# Patient Record
Sex: MALE | Race: WHITE | Employment: OTHER | URBAN - METROPOLITAN AREA
[De-identification: names, ages, dates, MRNs, and addresses within clinical notes are randomized per-mention and may not be internally consistent; named-entity substitution may affect disease eponyms.]

---

## 2017-03-14 RX ORDER — HYDROXYUREA 500 MG/1
CAPSULE ORAL
Qty: 90 CAPSULE | Refills: 3 | Status: SHIPPED | COMMUNITY
Start: 2017-03-14 | End: 2018-02-23

## 2017-03-14 NOTE — MR AVS SNAPSHOT
After Visit Summary   4/11/2017    Brando Victoria    MRN: [id number]           Diagnoses this Visit     Essential thrombocytosis ([facility] [address]    -  Primary       Allergies     Bees       Your Vital Signs Were     BP Pulse Temp(Src) Resp    114/71 mmHg 52 97 December.       To Do List     Tuesday April 11, 2017     Ysitie 6 Internal:  CBC W/ DIFFERENTIAL        Tuesday April 11, 2017     LAB:  CBC WITH DIFFERENTIAL WITH PLATELET        Monday April 17, 2017 8:50 AM     Appointment with Vasu Mullen at [facility] .2 (H) 80.0-99.0  fL Final    MCH 34.6 (H) 27.0-33.2  pg Final    MCHC 33.2  31.0-37.0  g/dL Final    RDW 13.6  11.5-16.0  % Final    RDW-SD 52.0 (H) 35.1-46.3  fL Final    Neutrophil Absolute Prelim 5.00  1.30-6.70  x10 (3) uL Final    Neutrophil - - -

## 2017-04-11 ENCOUNTER — OFFICE VISIT (OUTPATIENT)
Dept: HEMATOLOGY/ONCOLOGY | Facility: HOSPITAL | Age: 72
End: 2017-04-11
Attending: INTERNAL MEDICINE
Payer: MEDICARE

## 2017-04-11 VITALS
HEART RATE: 52 BPM | BODY MASS INDEX: 27.46 KG/M2 | SYSTOLIC BLOOD PRESSURE: 114 MMHG | OXYGEN SATURATION: 98 % | WEIGHT: 185.38 LBS | HEIGHT: 69.02 IN | DIASTOLIC BLOOD PRESSURE: 71 MMHG | TEMPERATURE: 97 F | RESPIRATION RATE: 18 BRPM

## 2017-04-11 DIAGNOSIS — D47.3 ESSENTIAL THROMBOCYTOSIS (HCC): Primary | ICD-10-CM

## 2017-04-11 PROCEDURE — 99213 OFFICE O/P EST LOW 20 MIN: CPT | Performed by: INTERNAL MEDICINE

## 2017-04-11 RX ORDER — LORAZEPAM 0.5 MG/1
0.5 TABLET ORAL NIGHTLY
COMMUNITY
End: 2017-07-03

## 2017-04-11 NOTE — PROGRESS NOTES
Patient is here for MD f/u for thrombocytosis. On Hydrea 500 mg daily. Appetite and energy level is good. No complaints. Education Record    Learner:  Patient and Spouse    Disease / Diagnosis:   Thrombocytosis     Barriers / Limitations:  None   Comm

## 2017-04-13 NOTE — PROGRESS NOTES
[facility]    PATIENT'S NAME: Renukakhushi Ackermane   ATTENDING PHYSICIAN: Sonali Watson M.D.    PATIENT ACCOUNT #: [de-identified] LOCATION: [address] [place] [address] RECORD #: [id number] YOB: 1945   DATE OF SERVICE: 04/11/2017       CANCER CENTER percussion and clear to auscultation. No wheezing, rales, or rhonchi. HEART:  Normal.  ABDOMEN:  No hepatosplenomegaly or tenderness. EXTREMITIES:  No clubbing, cyanosis, or edema. NEUROLOGIC:  He is intact.     LABORATORY DATA:  White count 7.3, hemogl

## 2017-07-10 ENCOUNTER — PRIOR ORIGINAL RECORDS (OUTPATIENT)
Dept: OTHER | Age: 72
End: 2017-07-10

## 2017-07-10 ENCOUNTER — LAB ENCOUNTER (OUTPATIENT)
Dept: LAB | Facility: HOSPITAL | Age: 72
End: 2017-07-10
Attending: INTERNAL MEDICINE
Payer: MEDICARE

## 2017-07-10 DIAGNOSIS — E78.00 PURE HYPERCHOLESTEROLEMIA: Primary | ICD-10-CM

## 2017-07-10 DIAGNOSIS — D47.3 ESSENTIAL THROMBOCYTOSIS (HCC): ICD-10-CM

## 2017-07-10 LAB
ALT SERPL-CCNC: 26 U/L (ref 17–63)
AST SERPL-CCNC: 19 U/L (ref 15–41)
BASOPHILS # BLD AUTO: 0.06 X10(3) UL (ref 0–0.1)
BASOPHILS NFR BLD AUTO: 0.9 %
BUN BLD-MCNC: 12 MG/DL (ref 8–20)
CALCIUM BLD-MCNC: 9.3 MG/DL (ref 8.3–10.3)
CHLORIDE: 106 MMOL/L (ref 101–111)
CHOLEST SMN-MCNC: 140 MG/DL (ref ?–200)
CO2: 25 MMOL/L (ref 22–32)
CREAT BLD-MCNC: 0.89 MG/DL (ref 0.7–1.3)
EOSINOPHIL # BLD AUTO: 0.12 X10(3) UL (ref 0–0.3)
EOSINOPHIL NFR BLD AUTO: 1.8 %
ERYTHROCYTE [DISTWIDTH] IN BLOOD BY AUTOMATED COUNT: 13.7 % (ref 11.5–16)
GLUCOSE BLD-MCNC: 85 MG/DL (ref 70–99)
HCT VFR BLD AUTO: 41.7 % (ref 37–53)
HDLC SERPL-MCNC: 60 MG/DL (ref 45–?)
HDLC SERPL: 2.33 {RATIO} (ref ?–4.97)
HGB BLD-MCNC: 13.8 G/DL (ref 13–17)
IMMATURE GRANULOCYTE COUNT: 0.04 X10(3) UL (ref 0–1)
IMMATURE GRANULOCYTE RATIO %: 0.6 %
LDLC SERPL CALC-MCNC: 60 MG/DL (ref ?–130)
LYMPHOCYTES # BLD AUTO: 1.29 X10(3) UL (ref 0.9–4)
LYMPHOCYTES NFR BLD AUTO: 19 %
MCH RBC QN AUTO: 33.5 PG (ref 27–33.2)
MCHC RBC AUTO-ENTMCNC: 33.1 G/DL (ref 31–37)
MCV RBC AUTO: 101.2 FL (ref 80–99)
MONOCYTES # BLD AUTO: 0.63 X10(3) UL (ref 0.1–0.6)
MONOCYTES NFR BLD AUTO: 9.3 %
NEUTROPHIL ABS PRELIM: 4.64 X10 (3) UL (ref 1.3–6.7)
NEUTROPHILS # BLD AUTO: 4.64 X10(3) UL (ref 1.3–6.7)
NEUTROPHILS NFR BLD AUTO: 68.4 %
NONHDLC SERPL-MCNC: 80 MG/DL (ref ?–130)
PLATELET # BLD AUTO: 410 10(3)UL (ref 150–450)
POTASSIUM SERPL-SCNC: 4.2 MMOL/L (ref 3.6–5.1)
RBC # BLD AUTO: 4.12 X10(6)UL (ref 3.8–5.8)
RED CELL DISTRIBUTION WIDTH-SD: 51.3 FL (ref 35.1–46.3)
SODIUM SERPL-SCNC: 137 MMOL/L (ref 136–144)
TRIGLYCERIDES: 100 MG/DL (ref ?–150)
VLDL: 20 MG/DL (ref 5–40)
WBC # BLD AUTO: 6.8 X10(3) UL (ref 4–13)

## 2017-07-10 PROCEDURE — 85025 COMPLETE CBC W/AUTO DIFF WBC: CPT

## 2017-07-10 PROCEDURE — 80061 LIPID PANEL: CPT

## 2017-07-10 PROCEDURE — 36415 COLL VENOUS BLD VENIPUNCTURE: CPT

## 2017-07-10 PROCEDURE — 80048 BASIC METABOLIC PNL TOTAL CA: CPT

## 2017-07-10 PROCEDURE — 84460 ALANINE AMINO (ALT) (SGPT): CPT

## 2017-07-10 PROCEDURE — 84450 TRANSFERASE (AST) (SGOT): CPT

## 2017-07-12 LAB
BUN: 12 MG/DL
CALCIUM: 9.3 MG/DL
CHOLESTEROL, TOTAL: 140 MG/DL
CREATININE, SERUM: 0.89 MG/DL
GLUCOSE: 85 MG/DL
HDL CHOLESTEROL: 60 MG/DL
LDL CHOLESTEROL: 60 MG/DL
POTASSIUM, SERUM: 4.2 MEQ/L
SGOT (AST): 19 IU/L
SGPT (ALT): 26 IU/L
SODIUM: 137 MEQ/L
TRIGLYCERIDES: 100 MG/DL

## 2017-07-13 ENCOUNTER — PRIOR ORIGINAL RECORDS (OUTPATIENT)
Dept: OTHER | Age: 72
End: 2017-07-13

## 2017-07-19 LAB
HEMATOCRIT: 41.7 %
HEMOGLOBIN: 13.8 G/DL
PLATELETS: 410 K/UL
RED BLOOD COUNT: 4.12 X 10-6/U
WHITE BLOOD COUNT: 6.8 X 10-3/U

## 2017-08-09 ENCOUNTER — APPOINTMENT (OUTPATIENT)
Dept: HEMATOLOGY/ONCOLOGY | Facility: HOSPITAL | Age: 72
End: 2017-08-09
Attending: INTERNAL MEDICINE
Payer: MEDICARE

## 2017-12-01 ENCOUNTER — OFFICE VISIT (OUTPATIENT)
Dept: HEMATOLOGY/ONCOLOGY | Facility: HOSPITAL | Age: 72
End: 2017-12-01
Attending: INTERNAL MEDICINE
Payer: MEDICARE

## 2017-12-01 VITALS
HEIGHT: 69.02 IN | HEART RATE: 53 BPM | RESPIRATION RATE: 18 BRPM | WEIGHT: 182.38 LBS | TEMPERATURE: 96 F | BODY MASS INDEX: 27.01 KG/M2 | OXYGEN SATURATION: 97 % | SYSTOLIC BLOOD PRESSURE: 110 MMHG | DIASTOLIC BLOOD PRESSURE: 74 MMHG

## 2017-12-01 DIAGNOSIS — D47.3 ESSENTIAL THROMBOCYTOSIS (HCC): ICD-10-CM

## 2017-12-01 PROCEDURE — 99213 OFFICE O/P EST LOW 20 MIN: CPT | Performed by: INTERNAL MEDICINE

## 2017-12-02 NOTE — PROGRESS NOTES
[facility]    PATIENT'S NAME: Nessa Felipe   ATTENDING PHYSICIAN: Mariaa Morris M.D.    PATIENT ACCOUNT #: [de-identified] LOCATION: [address] [place] [address] RECORD #: [id number] YOB: 1945   DATE OF SERVICE: 12/01/2017       CANCER CENTER intact. LABORATORY DATA:  His white count is 5.6, hemoglobin 13.8, platelets are 413. His MCV is 105 as expected from the hydroxyurea. His differential is normal.      IMPRESSION:  Essentially thrombocytosis.   While his platelet count is slightly over

## 2017-12-07 ENCOUNTER — PRIOR ORIGINAL RECORDS (OUTPATIENT)
Dept: OTHER | Age: 72
End: 2017-12-07

## 2017-12-18 ENCOUNTER — HOSPITAL ENCOUNTER (OUTPATIENT)
Dept: CV DIAGNOSTICS | Facility: HOSPITAL | Age: 72
Discharge: HOME | End: 2017-12-18
Attending: INTERNAL MEDICINE
Payer: MEDICARE

## 2017-12-18 DIAGNOSIS — I25.119 CORONARY ARTERY DISEASE INVOLVING NATIVE HEART WITH ANGINA PECTORIS, UNSPECIFIED VESSEL OR LESION TYPE (HCC): ICD-10-CM

## 2017-12-18 PROCEDURE — 93017 CV STRESS TEST TRACING ONLY: CPT | Performed by: INTERNAL MEDICINE

## 2017-12-18 PROCEDURE — 93350 STRESS TTE ONLY: CPT | Performed by: INTERNAL MEDICINE

## 2017-12-18 PROCEDURE — 93018 CV STRESS TEST I&R ONLY: CPT | Performed by: INTERNAL MEDICINE

## 2018-02-23 ENCOUNTER — TELEPHONE (OUTPATIENT)
Dept: HEMATOLOGY/ONCOLOGY | Facility: HOSPITAL | Age: 73
End: 2018-02-23

## 2018-02-23 RX ORDER — HYDROXYUREA 500 MG/1
500 CAPSULE ORAL DAILY
Qty: 90 CAPSULE | Refills: 3 | Status: SHIPPED | OUTPATIENT
Start: 2018-02-23 | End: 2018-06-01

## 2018-04-11 ENCOUNTER — LAB ENCOUNTER (OUTPATIENT)
Dept: LAB | Age: 73
End: 2018-04-11
Attending: OTOLARYNGOLOGY
Payer: MEDICARE

## 2018-04-11 DIAGNOSIS — E07.9 THYROID DYSFUNCTION: ICD-10-CM

## 2018-04-11 PROCEDURE — 84443 ASSAY THYROID STIM HORMONE: CPT

## 2018-04-11 PROCEDURE — 36415 COLL VENOUS BLD VENIPUNCTURE: CPT

## 2018-04-11 PROCEDURE — 84439 ASSAY OF FREE THYROXINE: CPT

## 2018-05-03 PROBLEM — N13.8 BPH WITH OBSTRUCTION/LOWER URINARY TRACT SYMPTOMS: Status: ACTIVE | Noted: 2018-05-03

## 2018-05-03 PROBLEM — I25.119 CORONARY ARTERY DISEASE INVOLVING NATIVE HEART WITH ANGINA PECTORIS, UNSPECIFIED VESSEL OR LESION TYPE: Status: ACTIVE | Noted: 2018-05-03

## 2018-05-03 PROBLEM — I25.119 CORONARY ARTERY DISEASE INVOLVING NATIVE HEART WITH ANGINA PECTORIS, UNSPECIFIED VESSEL OR LESION TYPE (HCC): Status: ACTIVE | Noted: 2018-05-03

## 2018-05-03 PROBLEM — N40.1 BPH WITH OBSTRUCTION/LOWER URINARY TRACT SYMPTOMS: Status: ACTIVE | Noted: 2018-05-03

## 2018-05-11 ENCOUNTER — LAB ENCOUNTER (OUTPATIENT)
Dept: LAB | Facility: HOSPITAL | Age: 73
End: 2018-05-11
Attending: INTERNAL MEDICINE
Payer: MEDICARE

## 2018-05-11 DIAGNOSIS — D47.3 ESSENTIAL THROMBOCYTOSIS (HCC): ICD-10-CM

## 2018-05-11 DIAGNOSIS — Z12.5 SCREENING PSA (PROSTATE SPECIFIC ANTIGEN): ICD-10-CM

## 2018-05-11 PROCEDURE — 84153 ASSAY OF PSA TOTAL: CPT

## 2018-05-11 PROCEDURE — 85027 COMPLETE CBC AUTOMATED: CPT

## 2018-05-11 PROCEDURE — 36415 COLL VENOUS BLD VENIPUNCTURE: CPT

## 2018-05-11 NOTE — PROGRESS NOTES
Your lab results are within acceptable limits. The MCV is slightly higher than in the past. This is likely due to hydrea. Please discuss this lab with Dr. Laura Begum.

## 2018-06-01 ENCOUNTER — OFFICE VISIT (OUTPATIENT)
Dept: HEMATOLOGY/ONCOLOGY | Facility: HOSPITAL | Age: 73
End: 2018-06-01
Attending: INTERNAL MEDICINE
Payer: MEDICARE

## 2018-06-01 VITALS
SYSTOLIC BLOOD PRESSURE: 111 MMHG | TEMPERATURE: 97 F | HEART RATE: 57 BPM | RESPIRATION RATE: 18 BRPM | HEIGHT: 69.02 IN | BODY MASS INDEX: 26.46 KG/M2 | DIASTOLIC BLOOD PRESSURE: 68 MMHG | OXYGEN SATURATION: 97 % | WEIGHT: 178.63 LBS

## 2018-06-01 DIAGNOSIS — D47.3 ESSENTIAL THROMBOCYTOSIS (HCC): ICD-10-CM

## 2018-06-01 PROCEDURE — 99213 OFFICE O/P EST LOW 20 MIN: CPT | Performed by: INTERNAL MEDICINE

## 2018-06-01 RX ORDER — HYDROXYUREA 500 MG/1
CAPSULE ORAL
Qty: 90 CAPSULE | Refills: 1 | Status: SHIPPED | OUTPATIENT
Start: 2018-06-01 | End: 2018-12-03

## 2018-06-01 NOTE — PROGRESS NOTES
.Outpatient Oncology Care Plan  Problem list:  knowledge deficit    Problems related to:    disease/disease progression    Interventions:  provided general teaching    Expected outcomes:  understands plan of care    Progress towards outcome:  making progre

## 2018-06-01 NOTE — PROGRESS NOTES
[facility]    PATIENT'S NAME: Jonathan Cook   ATTENDING PHYSICIAN: Jose Maya M.D.    PATIENT ACCOUNT #: [de-identified] LOCATION: [address] [place] [address] RECORD #: [id number] YOB: 1945   DATE OF SERVICE: 06/01/2018       CANCER CENTER rales, or rhonchi. HEART:  Normal.   ABDOMEN:  No hepatosplenomegaly or tenderness. EXTREMITIES:  He has no clubbing, cyanosis, or edema. NEUROLOGIC:  He is intact. LABORATORY DATA:  His labs were done 3 weeks ago.   White count was 6.5, hemoglobi

## 2018-06-04 ENCOUNTER — HOSPITAL ENCOUNTER (OUTPATIENT)
Dept: LAB | Facility: HOSPITAL | Age: 73
Discharge: HOME | End: 2018-06-04
Attending: INTERNAL MEDICINE
Payer: MEDICARE

## 2018-06-04 ENCOUNTER — PRIOR ORIGINAL RECORDS (OUTPATIENT)
Dept: OTHER | Age: 73
End: 2018-06-04

## 2018-06-04 PROCEDURE — 84460 ALANINE AMINO (ALT) (SGPT): CPT | Performed by: INTERNAL MEDICINE

## 2018-06-04 PROCEDURE — 36415 COLL VENOUS BLD VENIPUNCTURE: CPT | Performed by: INTERNAL MEDICINE

## 2018-06-04 PROCEDURE — 84450 TRANSFERASE (AST) (SGOT): CPT | Performed by: INTERNAL MEDICINE

## 2018-06-04 PROCEDURE — 80061 LIPID PANEL: CPT | Performed by: INTERNAL MEDICINE

## 2018-06-04 PROCEDURE — 80048 BASIC METABOLIC PNL TOTAL CA: CPT | Performed by: INTERNAL MEDICINE

## 2018-06-07 ENCOUNTER — PRIOR ORIGINAL RECORDS (OUTPATIENT)
Dept: OTHER | Age: 73
End: 2018-06-07

## 2018-06-22 LAB
BUN: 14 MG/DL
CALCIUM: 9.7 MG/DL
CHLORIDE: 106 MEQ/L
CHOLESTEROL, TOTAL: 146 MG/DL
CREATININE, SERUM: 0.92 MG/DL
GLUCOSE: 95 MG/DL
HDL CHOLESTEROL: 63 MG/DL
LDL CHOLESTEROL: 66 MG/DL
POTASSIUM, SERUM: 4.3 MEQ/L
SODIUM: 140 MEQ/L
TRIGLYCERIDES: 87 MG/DL

## 2018-09-10 PROCEDURE — 88305 TISSUE EXAM BY PATHOLOGIST: CPT | Performed by: INTERNAL MEDICINE

## 2018-09-14 ENCOUNTER — PRIOR ORIGINAL RECORDS (OUTPATIENT)
Dept: OTHER | Age: 73
End: 2018-09-14

## 2018-12-03 RX ORDER — HYDROXYUREA 500 MG/1
500 CAPSULE ORAL
Qty: 90 CAPSULE | Refills: 1 | Status: SHIPPED | OUTPATIENT
Start: 2018-12-03 | End: 2019-06-06

## 2018-12-06 ENCOUNTER — MYAURORA ACCOUNT LINK (OUTPATIENT)
Dept: OTHER | Age: 73
End: 2018-12-06

## 2018-12-06 ENCOUNTER — PRIOR ORIGINAL RECORDS (OUTPATIENT)
Dept: OTHER | Age: 73
End: 2018-12-06

## 2018-12-18 ENCOUNTER — OFFICE VISIT (OUTPATIENT)
Dept: HEMATOLOGY/ONCOLOGY | Facility: HOSPITAL | Age: 73
End: 2018-12-18
Attending: INTERNAL MEDICINE
Payer: MEDICARE

## 2018-12-18 VITALS
TEMPERATURE: 97 F | SYSTOLIC BLOOD PRESSURE: 110 MMHG | RESPIRATION RATE: 18 BRPM | DIASTOLIC BLOOD PRESSURE: 72 MMHG | BODY MASS INDEX: 26.93 KG/M2 | WEIGHT: 181.81 LBS | HEART RATE: 61 BPM | OXYGEN SATURATION: 98 % | HEIGHT: 69.02 IN

## 2018-12-18 DIAGNOSIS — D47.3 ESSENTIAL THROMBOCYTOSIS (HCC): Primary | ICD-10-CM

## 2018-12-18 PROCEDURE — 99213 OFFICE O/P EST LOW 20 MIN: CPT | Performed by: INTERNAL MEDICINE

## 2018-12-18 NOTE — PROGRESS NOTES
Patient is here for MD f/u for essential thrombocytosis. Patient had left shoulder rotator cuff on 9/28. Patient c/o pain in right elbow. Seeing ortho for this. Continues on Hydrea 500 mg daily. No complaints at present time.       Education Record    [facility]

## 2019-01-01 NOTE — PROGRESS NOTES
[facility]    PATIENT'S NAME: Marco Staton   ATTENDING PHYSICIAN: Flaquita Betts M.D.    PATIENT ACCOUNT #: [de-identified] LOCATION: [address] [place] [address] RECORD #: [id number] YOB: 1945   DATE OF SERVICE: 12/18/2018       CANCER CENTER is 20, temperature is 97.1. HEENT:  Unremarkable. He has pink conjunctivae, anicteric sclerae. Pharynx is without lesions. LYMPHATICS:  He has no cervical, supraclavicular, or axillary adenopathy.   LUNGS:  Resonant to percussion and clear to auscult

## 2019-02-28 VITALS
WEIGHT: 182 LBS | DIASTOLIC BLOOD PRESSURE: 58 MMHG | HEART RATE: 55 BPM | HEIGHT: 69 IN | SYSTOLIC BLOOD PRESSURE: 110 MMHG | BODY MASS INDEX: 26.96 KG/M2

## 2019-02-28 VITALS — HEART RATE: 68 BPM | WEIGHT: 181 LBS | SYSTOLIC BLOOD PRESSURE: 90 MMHG | DIASTOLIC BLOOD PRESSURE: 50 MMHG

## 2019-02-28 VITALS — SYSTOLIC BLOOD PRESSURE: 110 MMHG | DIASTOLIC BLOOD PRESSURE: 66 MMHG | HEART RATE: 60 BPM | WEIGHT: 183 LBS

## 2019-02-28 VITALS
DIASTOLIC BLOOD PRESSURE: 62 MMHG | HEIGHT: 70 IN | BODY MASS INDEX: 25.77 KG/M2 | WEIGHT: 180 LBS | SYSTOLIC BLOOD PRESSURE: 110 MMHG | HEART RATE: 72 BPM

## 2019-04-04 RX ORDER — FLECAINIDE ACETATE 50 MG/1
TABLET ORAL
COMMUNITY
Start: 2015-09-17

## 2019-04-04 RX ORDER — LEVOTHYROXINE SODIUM 88 UG/1
TABLET ORAL
COMMUNITY

## 2019-04-04 RX ORDER — SILICONE ADHESIVE 1.5" X 3"
SHEET (EA) TOPICAL
COMMUNITY

## 2019-04-04 RX ORDER — HYDROXYUREA 500 MG/1
CAPSULE ORAL
COMMUNITY

## 2019-04-04 RX ORDER — VARDENAFIL HYDROCHLORIDE 20 MG/1
TABLET ORAL
COMMUNITY

## 2019-04-04 RX ORDER — FAMOTIDINE 20 MG/1
TABLET, FILM COATED ORAL
COMMUNITY
End: 2019-07-18 | Stop reason: ALTCHOICE

## 2019-04-04 RX ORDER — GLUCOSAMINE/CHONDR SU A SOD 750-600 MG
TABLET ORAL
COMMUNITY
End: 2020-07-01 | Stop reason: SDUPTHER

## 2019-04-04 RX ORDER — METOPROLOL SUCCINATE 25 MG/1
TABLET, EXTENDED RELEASE ORAL
COMMUNITY
Start: 2014-06-12

## 2019-04-04 RX ORDER — UBIDECARENONE 50 MG
CAPSULE ORAL
COMMUNITY
Start: 2015-08-06

## 2019-04-04 RX ORDER — ATORVASTATIN CALCIUM 40 MG/1
TABLET, FILM COATED ORAL
COMMUNITY
Start: 2017-12-07

## 2019-04-10 ENCOUNTER — LAB ENCOUNTER (OUTPATIENT)
Dept: LAB | Facility: HOSPITAL | Age: 74
End: 2019-04-10
Attending: OTOLARYNGOLOGY
Payer: MEDICARE

## 2019-04-10 DIAGNOSIS — E03.9 HYPOTHYROIDISM, UNSPECIFIED TYPE: ICD-10-CM

## 2019-04-10 DIAGNOSIS — E07.9 DISORDER OF THYROID GLAND: ICD-10-CM

## 2019-04-10 DIAGNOSIS — E07.9 THYROID DYSFUNCTION: ICD-10-CM

## 2019-04-10 PROCEDURE — 84439 ASSAY OF FREE THYROXINE: CPT

## 2019-04-10 PROCEDURE — 84443 ASSAY THYROID STIM HORMONE: CPT

## 2019-04-10 PROCEDURE — 36415 COLL VENOUS BLD VENIPUNCTURE: CPT

## 2019-04-16 ENCOUNTER — APPOINTMENT (OUTPATIENT)
Dept: HEMATOLOGY/ONCOLOGY | Facility: HOSPITAL | Age: 74
End: 2019-04-16
Attending: INTERNAL MEDICINE
Payer: MEDICARE

## 2019-04-16 DIAGNOSIS — D47.3 ESSENTIAL THROMBOCYTOSIS (HCC): ICD-10-CM

## 2019-04-16 DIAGNOSIS — D47.3 ESSENTIAL THROMBOCYTOSIS (HCC): Primary | ICD-10-CM

## 2019-04-16 PROCEDURE — 36415 COLL VENOUS BLD VENIPUNCTURE: CPT

## 2019-04-16 PROCEDURE — 85025 COMPLETE CBC W/AUTO DIFF WBC: CPT

## 2019-04-16 NOTE — PROGRESS NOTES
Spoke with patient. Plts were higher. He was ill with diarrheal illness last 24 hours. Told him to stay on current dose and recheck in a month.   MOISES Coleman

## 2019-05-06 ENCOUNTER — TELEPHONE (OUTPATIENT)
Dept: CARDIOLOGY | Age: 74
End: 2019-05-06

## 2019-05-06 PROBLEM — D75.89 MACROCYTOSIS WITHOUT ANEMIA: Status: ACTIVE | Noted: 2019-05-06

## 2019-05-06 PROBLEM — I10 ESSENTIAL HYPERTENSION: Status: ACTIVE | Noted: 2019-05-06

## 2019-05-15 ENCOUNTER — HOSPITAL ENCOUNTER (OUTPATIENT)
Dept: CARDIOLOGY CLINIC | Facility: HOSPITAL | Age: 74
Discharge: HOME | End: 2019-05-15
Attending: INTERNAL MEDICINE

## 2019-05-15 DIAGNOSIS — Z13.6 SCREENING FOR CARDIOVASCULAR CONDITION: ICD-10-CM

## 2019-05-23 ENCOUNTER — NURSE ONLY (OUTPATIENT)
Dept: HEMATOLOGY/ONCOLOGY | Facility: HOSPITAL | Age: 74
End: 2019-05-23
Attending: INTERNAL MEDICINE
Payer: MEDICARE

## 2019-05-23 DIAGNOSIS — D47.3 ESSENTIAL THROMBOCYTOSIS (HCC): ICD-10-CM

## 2019-05-23 PROCEDURE — 85025 COMPLETE CBC W/AUTO DIFF WBC: CPT

## 2019-05-23 PROCEDURE — 36415 COLL VENOUS BLD VENIPUNCTURE: CPT

## 2019-06-06 ENCOUNTER — TELEPHONE (OUTPATIENT)
Dept: HEMATOLOGY/ONCOLOGY | Facility: HOSPITAL | Age: 74
End: 2019-06-06

## 2019-06-06 RX ORDER — HYDROXYUREA 500 MG/1
500 CAPSULE ORAL
Qty: 90 CAPSULE | Refills: 2 | Status: SHIPPED | OUTPATIENT
Start: 2019-06-06 | End: 2019-12-17

## 2019-06-20 ENCOUNTER — APPOINTMENT (OUTPATIENT)
Dept: CARDIOLOGY | Age: 74
End: 2019-06-20

## 2019-07-16 ENCOUNTER — LAB ENCOUNTER (OUTPATIENT)
Dept: LAB | Facility: HOSPITAL | Age: 74
End: 2019-07-16
Attending: INTERNAL MEDICINE
Payer: MEDICARE

## 2019-07-16 DIAGNOSIS — I10 ESSENTIAL HYPERTENSION, MALIGNANT: ICD-10-CM

## 2019-07-16 DIAGNOSIS — E78.00 PURE HYPERCHOLESTEROLEMIA: Primary | ICD-10-CM

## 2019-07-16 LAB
ALT SERPL-CCNC: 22 U/L (ref 16–61)
ANION GAP SERPL CALC-SCNC: 2 MMOL/L (ref 0–18)
AST SERPL-CCNC: 19 U/L (ref 15–37)
BUN BLD-MCNC: 13 MG/DL (ref 7–18)
BUN/CREAT SERPL: 14.3 (ref 10–20)
CALCIUM BLD-MCNC: 9.1 MG/DL (ref 8.5–10.1)
CHLORIDE SERPL-SCNC: 106 MMOL/L (ref 98–112)
CHOLEST SMN-MCNC: 128 MG/DL (ref ?–200)
CO2 SERPL-SCNC: 29 MMOL/L (ref 21–32)
CREAT BLD-MCNC: 0.91 MG/DL (ref 0.7–1.3)
GLUCOSE BLD-MCNC: 79 MG/DL (ref 70–99)
HDLC SERPL-MCNC: 61 MG/DL (ref 40–59)
LDLC SERPL CALC-MCNC: 55 MG/DL (ref ?–100)
NONHDLC SERPL-MCNC: 67 MG/DL (ref ?–130)
OSMOLALITY SERPL CALC.SUM OF ELEC: 283 MOSM/KG (ref 275–295)
POTASSIUM SERPL-SCNC: 4.3 MMOL/L (ref 3.5–5.1)
SODIUM SERPL-SCNC: 137 MMOL/L (ref 136–145)
TRIGL SERPL-MCNC: 60 MG/DL (ref 30–149)
VLDLC SERPL CALC-MCNC: 12 MG/DL (ref 0–30)

## 2019-07-16 PROCEDURE — 84450 TRANSFERASE (AST) (SGOT): CPT

## 2019-07-16 PROCEDURE — 80048 BASIC METABOLIC PNL TOTAL CA: CPT

## 2019-07-16 PROCEDURE — 84460 ALANINE AMINO (ALT) (SGPT): CPT

## 2019-07-16 PROCEDURE — 80061 LIPID PANEL: CPT

## 2019-07-16 PROCEDURE — 36415 COLL VENOUS BLD VENIPUNCTURE: CPT

## 2019-07-18 ENCOUNTER — OFFICE VISIT (OUTPATIENT)
Dept: CARDIOLOGY | Age: 74
End: 2019-07-18

## 2019-07-18 VITALS
WEIGHT: 180 LBS | SYSTOLIC BLOOD PRESSURE: 104 MMHG | DIASTOLIC BLOOD PRESSURE: 60 MMHG | HEIGHT: 70 IN | HEART RATE: 60 BPM | BODY MASS INDEX: 25.77 KG/M2

## 2019-07-18 DIAGNOSIS — I65.23 ASYMPTOMATIC BILATERAL CAROTID ARTERY STENOSIS: ICD-10-CM

## 2019-07-18 DIAGNOSIS — I25.10 CAD IN NATIVE ARTERY: Primary | ICD-10-CM

## 2019-07-18 DIAGNOSIS — E78.00 PURE HYPERCHOLESTEROLEMIA: ICD-10-CM

## 2019-07-18 PROBLEM — I48.0 PAROXYSMAL ATRIAL FIBRILLATION (CMD): Status: ACTIVE | Noted: 2018-12-06

## 2019-07-18 PROBLEM — Z82.49 FAMILY HISTORY OF CORONARY ARTERIOSCLEROSIS: Status: ACTIVE | Noted: 2017-07-13

## 2019-07-18 PROCEDURE — 99214 OFFICE O/P EST MOD 30 MIN: CPT | Performed by: INTERNAL MEDICINE

## 2019-07-18 RX ORDER — OMEPRAZOLE 40 MG/1
40 CAPSULE, DELAYED RELEASE ORAL DAILY
COMMUNITY
End: 2020-07-01

## 2019-12-03 ENCOUNTER — DOCUMENTATION (OUTPATIENT)
Dept: CARDIOLOGY | Age: 74
End: 2019-12-03

## 2019-12-03 ENCOUNTER — HOSPITAL ENCOUNTER (OUTPATIENT)
Dept: CV DIAGNOSTICS | Facility: HOSPITAL | Age: 74
Discharge: HOME | End: 2019-12-03
Attending: INTERNAL MEDICINE
Payer: MEDICARE

## 2019-12-03 DIAGNOSIS — I25.10 CAD IN NATIVE ARTERY: ICD-10-CM

## 2019-12-03 PROCEDURE — 93350 STRESS TTE ONLY: CPT | Performed by: INTERNAL MEDICINE

## 2019-12-03 PROCEDURE — 93017 CV STRESS TEST TRACING ONLY: CPT | Performed by: INTERNAL MEDICINE

## 2019-12-03 PROCEDURE — 93018 CV STRESS TEST I&R ONLY: CPT | Performed by: INTERNAL MEDICINE

## 2019-12-05 ENCOUNTER — TELEPHONE (OUTPATIENT)
Dept: CARDIOLOGY | Age: 74
End: 2019-12-05

## 2019-12-10 DIAGNOSIS — I25.10 CAD IN NATIVE ARTERY: ICD-10-CM

## 2019-12-17 ENCOUNTER — OFFICE VISIT (OUTPATIENT)
Dept: HEMATOLOGY/ONCOLOGY | Facility: HOSPITAL | Age: 74
End: 2019-12-17
Attending: INTERNAL MEDICINE
Payer: MEDICARE

## 2019-12-17 VITALS
DIASTOLIC BLOOD PRESSURE: 74 MMHG | SYSTOLIC BLOOD PRESSURE: 121 MMHG | WEIGHT: 182 LBS | HEIGHT: 69.02 IN | BODY MASS INDEX: 26.96 KG/M2 | TEMPERATURE: 97 F | HEART RATE: 56 BPM | OXYGEN SATURATION: 98 % | RESPIRATION RATE: 16 BRPM

## 2019-12-17 DIAGNOSIS — D47.3 ESSENTIAL THROMBOCYTOSIS (HCC): ICD-10-CM

## 2019-12-17 PROCEDURE — 99213 OFFICE O/P EST LOW 20 MIN: CPT | Performed by: INTERNAL MEDICINE

## 2019-12-17 RX ORDER — HYDROXYUREA 500 MG/1
500 CAPSULE ORAL
Qty: 90 CAPSULE | Refills: 3 | Status: SHIPPED | OUTPATIENT
Start: 2019-12-17 | End: 2020-12-08

## 2019-12-17 NOTE — PROGRESS NOTES
Patient is here for MD f/u for essential thrombocytosis. Continues on Hydrea 500 mg daily. He is tolerating this well. Eating well. Denies pain. No complaints.       Education Record    Learner:  Patient    Disease / Diagnosis:  Essential Thrombocytosis

## 2019-12-19 RX ORDER — OMEPRAZOLE 20 MG/1
CAPSULE, DELAYED RELEASE ORAL
COMMUNITY
Start: 2019-11-23 | End: 2020-07-01

## 2019-12-19 RX ORDER — ACETAMINOPHEN 650 MG/1
650 SUPPOSITORY RECTAL
COMMUNITY
Start: 2015-05-21 | End: 2019-12-23 | Stop reason: ALTCHOICE

## 2019-12-19 RX ORDER — TRAMADOL HYDROCHLORIDE 50 MG/1
50-100 TABLET ORAL
COMMUNITY
Start: 2019-07-05

## 2019-12-19 RX ORDER — KETOCONAZOLE 20 MG/ML
SHAMPOO TOPICAL
COMMUNITY
Start: 2019-11-07

## 2019-12-19 RX ORDER — LIDOCAINE 50 MG/G
OINTMENT TOPICAL
COMMUNITY
Start: 2017-12-26

## 2019-12-19 RX ORDER — LORAZEPAM 0.5 MG/1
TABLET ORAL
COMMUNITY
Start: 2019-12-03

## 2019-12-19 RX ORDER — CLINDAMYCIN PHOSPHATE 10 MG/G
GEL TOPICAL
COMMUNITY
Start: 2018-10-09

## 2019-12-23 ENCOUNTER — OFFICE VISIT (OUTPATIENT)
Dept: CARDIOLOGY | Age: 74
End: 2019-12-23

## 2019-12-23 VITALS
HEART RATE: 59 BPM | WEIGHT: 180 LBS | HEIGHT: 69 IN | BODY MASS INDEX: 26.66 KG/M2 | DIASTOLIC BLOOD PRESSURE: 56 MMHG | SYSTOLIC BLOOD PRESSURE: 120 MMHG

## 2019-12-23 DIAGNOSIS — I65.23 ASYMPTOMATIC BILATERAL CAROTID ARTERY STENOSIS: ICD-10-CM

## 2019-12-23 DIAGNOSIS — E78.00 PURE HYPERCHOLESTEROLEMIA: ICD-10-CM

## 2019-12-23 DIAGNOSIS — I25.10 CAD IN NATIVE ARTERY: Primary | ICD-10-CM

## 2019-12-23 DIAGNOSIS — Z82.49 FAMILY HISTORY OF CORONARY ARTERIOSCLEROSIS: ICD-10-CM

## 2019-12-23 PROCEDURE — 99214 OFFICE O/P EST MOD 30 MIN: CPT | Performed by: INTERNAL MEDICINE

## 2019-12-23 ASSESSMENT — PATIENT HEALTH QUESTIONNAIRE - PHQ9
SUM OF ALL RESPONSES TO PHQ9 QUESTIONS 1 AND 2: 0
2. FEELING DOWN, DEPRESSED OR HOPELESS: NOT AT ALL
SUM OF ALL RESPONSES TO PHQ9 QUESTIONS 1 AND 2: 0
1. LITTLE INTEREST OR PLEASURE IN DOING THINGS: NOT AT ALL

## 2019-12-31 NOTE — PROGRESS NOTES
[facility]    PATIENT'S NAME: Jasper Samirjason   ATTENDING PHYSICIAN: Joselyn Holm M.D.    PATIENT ACCOUNT #: [de-identified] LOCATION: [address] [place] [address] RECORD #: [id number] YOB: 1945   DATE OF SERVICE: 12/17/2019       CANCER CENTER supraclavicular, or axillary adenopathy. LUNGS:  Resonant to percussion and clear to auscultation, with no wheezing, rales, or rhonchi. HEART:  Regular S1 and S2, with no murmur or gallop. ABDOMEN:  No hepatosplenomegaly or tenderness.    EXTREMITIES:

## 2020-01-09 ENCOUNTER — TELEPHONE (OUTPATIENT)
Dept: CARDIOLOGY | Age: 75
End: 2020-01-09

## 2020-04-01 ENCOUNTER — LAB ENCOUNTER (OUTPATIENT)
Dept: LAB | Age: 75
End: 2020-04-01
Attending: OTOLARYNGOLOGY
Payer: MEDICARE

## 2020-04-01 DIAGNOSIS — E04.1 THYROID NODULE: ICD-10-CM

## 2020-04-01 DIAGNOSIS — E07.9 THYROID DYSFUNCTION: ICD-10-CM

## 2020-04-01 PROCEDURE — 84439 ASSAY OF FREE THYROXINE: CPT

## 2020-04-01 PROCEDURE — 36415 COLL VENOUS BLD VENIPUNCTURE: CPT

## 2020-04-01 PROCEDURE — 84443 ASSAY THYROID STIM HORMONE: CPT

## 2020-05-19 PROBLEM — M16.12 OSTEOARTHRITIS OF LEFT HIP, UNSPECIFIED OSTEOARTHRITIS TYPE: Status: ACTIVE | Noted: 2020-05-19

## 2020-06-04 ENCOUNTER — LAB ENCOUNTER (OUTPATIENT)
Dept: LAB | Facility: HOSPITAL | Age: 75
End: 2020-06-04
Attending: INTERNAL MEDICINE
Payer: MEDICARE

## 2020-06-04 DIAGNOSIS — Z00.00 ROUTINE PHYSICAL EXAMINATION: ICD-10-CM

## 2020-06-04 DIAGNOSIS — Z12.5 SCREENING PSA (PROSTATE SPECIFIC ANTIGEN): ICD-10-CM

## 2020-06-04 DIAGNOSIS — I10 ESSENTIAL HYPERTENSION: ICD-10-CM

## 2020-06-04 DIAGNOSIS — D47.3 ESSENTIAL THROMBOCYTOSIS (HCC): ICD-10-CM

## 2020-06-04 DIAGNOSIS — N40.1 BPH WITH OBSTRUCTION/LOWER URINARY TRACT SYMPTOMS: ICD-10-CM

## 2020-06-04 DIAGNOSIS — N13.8 BPH WITH OBSTRUCTION/LOWER URINARY TRACT SYMPTOMS: ICD-10-CM

## 2020-06-04 PROCEDURE — 80053 COMPREHEN METABOLIC PANEL: CPT

## 2020-06-04 PROCEDURE — 80061 LIPID PANEL: CPT

## 2020-06-04 PROCEDURE — 85025 COMPLETE CBC W/AUTO DIFF WBC: CPT

## 2020-06-04 PROCEDURE — 36415 COLL VENOUS BLD VENIPUNCTURE: CPT

## 2020-07-01 ENCOUNTER — OFFICE VISIT (OUTPATIENT)
Dept: CARDIOLOGY | Age: 75
End: 2020-07-01

## 2020-07-01 VITALS
DIASTOLIC BLOOD PRESSURE: 60 MMHG | BODY MASS INDEX: 26.58 KG/M2 | WEIGHT: 180 LBS | HEART RATE: 57 BPM | SYSTOLIC BLOOD PRESSURE: 118 MMHG

## 2020-07-01 DIAGNOSIS — I65.23 ASYMPTOMATIC BILATERAL CAROTID ARTERY STENOSIS: ICD-10-CM

## 2020-07-01 DIAGNOSIS — I25.10 CAD IN NATIVE ARTERY: Primary | ICD-10-CM

## 2020-07-01 DIAGNOSIS — E78.00 PURE HYPERCHOLESTEROLEMIA: ICD-10-CM

## 2020-07-01 DIAGNOSIS — Z82.49 FAMILY HISTORY OF CORONARY ARTERIOSCLEROSIS: ICD-10-CM

## 2020-07-01 PROCEDURE — 99214 OFFICE O/P EST MOD 30 MIN: CPT | Performed by: INTERNAL MEDICINE

## 2020-07-13 ENCOUNTER — APPOINTMENT (OUTPATIENT)
Dept: CARDIOLOGY | Age: 75
End: 2020-07-13

## 2020-07-20 ENCOUNTER — APPOINTMENT (OUTPATIENT)
Dept: CARDIOLOGY | Age: 75
End: 2020-07-20

## 2020-08-06 PROBLEM — M18.12 ARTHRITIS OF CARPOMETACARPAL (CMC) JOINT OF LEFT THUMB: Status: ACTIVE | Noted: 2020-08-06

## 2020-08-06 PROBLEM — M65.4 TENOSYNOVITIS, DE QUERVAIN: Status: ACTIVE | Noted: 2020-08-06

## 2020-12-04 ENCOUNTER — LAB ENCOUNTER (OUTPATIENT)
Dept: LAB | Facility: HOSPITAL | Age: 75
End: 2020-12-04
Attending: INTERNAL MEDICINE
Payer: MEDICARE

## 2020-12-04 DIAGNOSIS — D47.3 ESSENTIAL THROMBOCYTOSIS (HCC): ICD-10-CM

## 2020-12-04 DIAGNOSIS — G62.9 NEUROPATHY: ICD-10-CM

## 2020-12-04 PROCEDURE — 84165 PROTEIN E-PHORESIS SERUM: CPT

## 2020-12-04 PROCEDURE — 85025 COMPLETE CBC W/AUTO DIFF WBC: CPT

## 2020-12-04 PROCEDURE — 85652 RBC SED RATE AUTOMATED: CPT

## 2020-12-04 PROCEDURE — 86334 IMMUNOFIX E-PHORESIS SERUM: CPT

## 2020-12-04 PROCEDURE — 83036 HEMOGLOBIN GLYCOSYLATED A1C: CPT

## 2020-12-04 PROCEDURE — 82607 VITAMIN B-12: CPT

## 2020-12-04 PROCEDURE — 80053 COMPREHEN METABOLIC PANEL: CPT

## 2020-12-04 PROCEDURE — 83883 ASSAY NEPHELOMETRY NOT SPEC: CPT

## 2020-12-04 PROCEDURE — 82550 ASSAY OF CK (CPK): CPT

## 2020-12-04 PROCEDURE — 83615 LACTATE (LD) (LDH) ENZYME: CPT

## 2020-12-04 PROCEDURE — 36415 COLL VENOUS BLD VENIPUNCTURE: CPT

## 2020-12-04 PROCEDURE — 82746 ASSAY OF FOLIC ACID SERUM: CPT

## 2020-12-04 NOTE — PROGRESS NOTES
Your folic acid level is on the low end. Lets get it up and see if that helps.   Please take 800 mcg OTC (comes in 400 mcg pills, take 2 daily)  Dr Aggie Frost

## 2020-12-08 ENCOUNTER — OFFICE VISIT (OUTPATIENT)
Dept: HEMATOLOGY/ONCOLOGY | Facility: HOSPITAL | Age: 75
End: 2020-12-08
Attending: INTERNAL MEDICINE
Payer: MEDICARE

## 2020-12-08 VITALS
WEIGHT: 183 LBS | DIASTOLIC BLOOD PRESSURE: 75 MMHG | SYSTOLIC BLOOD PRESSURE: 124 MMHG | TEMPERATURE: 98 F | HEART RATE: 61 BPM | HEIGHT: 69.02 IN | OXYGEN SATURATION: 98 % | RESPIRATION RATE: 16 BRPM | BODY MASS INDEX: 27.11 KG/M2

## 2020-12-08 DIAGNOSIS — D47.3 ESSENTIAL THROMBOCYTOSIS (HCC): ICD-10-CM

## 2020-12-08 PROCEDURE — 99213 OFFICE O/P EST LOW 20 MIN: CPT | Performed by: INTERNAL MEDICINE

## 2020-12-08 RX ORDER — HYDROXYUREA 500 MG/1
500 CAPSULE ORAL
Qty: 90 CAPSULE | Refills: 3 | Status: SHIPPED | OUTPATIENT
Start: 2020-12-08 | End: 2021-12-14

## 2020-12-08 RX ORDER — MELATONIN
800 DAILY
COMMUNITY

## 2020-12-08 NOTE — PROGRESS NOTES
Patient is here for MD f/u for essential thrombocytosis. Patient is on Hydrea 500 mg daily. Tolerating well. Feeling well. No complaints.        Education Record    Learner:  Patient    Disease / Diagnosis:  Essential Thrombocytosis     Barriers / Limitatio

## 2020-12-16 NOTE — PROGRESS NOTES
[facility]    PATIENT'S NAME: Judge Mitchell   ATTENDING PHYSICIAN: Beverley Arndt M.D.    PATIENT ACCOUNT #: [de-identified] LOCATION: [address] [place] [address] RECORD #: [id number] YOB: 1945   DATE OF SERVICE: 12/08/2020       CANCER CENTER 61, respiratory rate is 20, temperature is 98.0. HEENT:  Unremarkable. He has no adenopathy. LUNGS:  Clear. HEART:  Normal.  ABDOMEN:  No hepatosplenomegaly or tenderness. EXTREMITIES:  He has no clubbing, cyanosis, or edema.   NEUROLOGIC:  He is intac

## 2021-01-26 ENCOUNTER — IMMUNIZATION (OUTPATIENT)
Dept: LAB | Facility: HOSPITAL | Age: 76
End: 2021-01-26
Attending: EMERGENCY MEDICINE
Payer: MEDICARE

## 2021-01-26 DIAGNOSIS — Z23 NEED FOR VACCINATION: Primary | ICD-10-CM

## 2021-01-26 PROCEDURE — 0011A SARSCOV2 VAC 100MCG/0.5ML IM: CPT

## 2021-02-23 ENCOUNTER — IMMUNIZATION (OUTPATIENT)
Dept: LAB | Facility: HOSPITAL | Age: 76
End: 2021-02-23
Attending: EMERGENCY MEDICINE
Payer: MEDICARE

## 2021-02-23 DIAGNOSIS — Z23 NEED FOR VACCINATION: Primary | ICD-10-CM

## 2021-02-23 PROCEDURE — 0012A SARSCOV2 VAC 100MCG/0.5ML IM: CPT

## 2021-04-06 PROBLEM — K50.018: Status: ACTIVE | Noted: 2021-04-06

## 2021-04-06 PROBLEM — I70.0 AORTIC ATHEROSCLEROSIS: Status: ACTIVE | Noted: 2021-04-06

## 2021-04-06 PROBLEM — I70.0 AORTIC ATHEROSCLEROSIS (HCC): Status: ACTIVE | Noted: 2021-04-06

## 2021-04-07 ENCOUNTER — OFFICE VISIT (OUTPATIENT)
Dept: CARDIOLOGY | Age: 76
End: 2021-04-07

## 2021-04-07 VITALS
DIASTOLIC BLOOD PRESSURE: 50 MMHG | SYSTOLIC BLOOD PRESSURE: 90 MMHG | WEIGHT: 181 LBS | BODY MASS INDEX: 26.81 KG/M2 | HEART RATE: 56 BPM | HEIGHT: 69 IN

## 2021-04-07 DIAGNOSIS — I70.8 ATHEROSCLEROSIS OF ILIAC ARTERY: Primary | ICD-10-CM

## 2021-04-07 DIAGNOSIS — I65.23 ASYMPTOMATIC BILATERAL CAROTID ARTERY STENOSIS: ICD-10-CM

## 2021-04-07 DIAGNOSIS — I48.0 PAROXYSMAL ATRIAL FIBRILLATION (CMD): ICD-10-CM

## 2021-04-07 DIAGNOSIS — E78.00 PURE HYPERCHOLESTEROLEMIA: ICD-10-CM

## 2021-04-07 DIAGNOSIS — I25.10 CAD IN NATIVE ARTERY: ICD-10-CM

## 2021-04-07 DIAGNOSIS — Z82.49 FAMILY HISTORY OF CORONARY ARTERIOSCLEROSIS: ICD-10-CM

## 2021-04-07 PROCEDURE — 99214 OFFICE O/P EST MOD 30 MIN: CPT | Performed by: INTERNAL MEDICINE

## 2021-04-07 RX ORDER — OMEPRAZOLE 20 MG/1
CAPSULE, DELAYED RELEASE ORAL
COMMUNITY
Start: 2021-02-19

## 2021-04-07 ASSESSMENT — PATIENT HEALTH QUESTIONNAIRE - PHQ9
2. FEELING DOWN, DEPRESSED OR HOPELESS: NOT AT ALL
SUM OF ALL RESPONSES TO PHQ9 QUESTIONS 1 AND 2: 0
CLINICAL INTERPRETATION OF PHQ9 SCORE: NO FURTHER SCREENING NEEDED
CLINICAL INTERPRETATION OF PHQ2 SCORE: NO FURTHER SCREENING NEEDED
SUM OF ALL RESPONSES TO PHQ9 QUESTIONS 1 AND 2: 0
1. LITTLE INTEREST OR PLEASURE IN DOING THINGS: NOT AT ALL

## 2021-04-07 ASSESSMENT — ENCOUNTER SYMPTOMS
HEMATOCHEZIA: 0
WEIGHT LOSS: 0
WEIGHT GAIN: 0
BRUISES/BLEEDS EASILY: 0
SUSPICIOUS LESIONS: 0
HEMOPTYSIS: 0
CHILLS: 0
COUGH: 0
FEVER: 0
ABDOMINAL PAIN: 1
ALLERGIC/IMMUNOLOGIC COMMENTS: NO NEW FOOD ALLERGIES

## 2021-05-25 ENCOUNTER — LAB ENCOUNTER (OUTPATIENT)
Dept: LAB | Facility: HOSPITAL | Age: 76
End: 2021-05-25
Attending: INTERNAL MEDICINE
Payer: MEDICARE

## 2021-05-25 DIAGNOSIS — E87.1 HYPONATREMIA: ICD-10-CM

## 2021-05-25 DIAGNOSIS — I48.91 ATRIAL FIBRILLATION, UNSPECIFIED TYPE (HCC): ICD-10-CM

## 2021-05-25 DIAGNOSIS — I70.0 AORTIC ATHEROSCLEROSIS (HCC): ICD-10-CM

## 2021-05-25 DIAGNOSIS — E78.2 MIXED HYPERLIPIDEMIA: ICD-10-CM

## 2021-05-25 DIAGNOSIS — Z12.5 SCREENING PSA (PROSTATE SPECIFIC ANTIGEN): ICD-10-CM

## 2021-05-25 PROCEDURE — 36415 COLL VENOUS BLD VENIPUNCTURE: CPT

## 2021-05-25 PROCEDURE — 84295 ASSAY OF SERUM SODIUM: CPT

## 2021-05-25 PROCEDURE — 85025 COMPLETE CBC W/AUTO DIFF WBC: CPT

## 2021-05-25 PROCEDURE — 80061 LIPID PANEL: CPT

## 2021-05-26 PROBLEM — M25.552 CHRONIC LEFT HIP PAIN: Status: ACTIVE | Noted: 2021-05-26

## 2021-05-26 PROBLEM — G89.29 CHRONIC LEFT HIP PAIN: Status: ACTIVE | Noted: 2021-05-26

## 2021-06-24 PROBLEM — M65.321 TRIGGER FINGER, RIGHT INDEX FINGER: Status: ACTIVE | Noted: 2021-06-24

## 2021-07-02 ENCOUNTER — LAB ENCOUNTER (OUTPATIENT)
Dept: LAB | Facility: HOSPITAL | Age: 76
End: 2021-07-02
Attending: INTERNAL MEDICINE
Payer: MEDICARE

## 2021-07-02 DIAGNOSIS — D47.3 ESSENTIAL THROMBOCYTOSIS (HCC): ICD-10-CM

## 2021-07-02 LAB
ALBUMIN SERPL-MCNC: 3.7 G/DL (ref 3.4–5)
ALBUMIN/GLOB SERPL: 1.2 {RATIO} (ref 1–2)
ALP LIVER SERPL-CCNC: 98 U/L
ALT SERPL-CCNC: 27 U/L
ANION GAP SERPL CALC-SCNC: 5 MMOL/L (ref 0–18)
AST SERPL-CCNC: 19 U/L (ref 15–37)
BASOPHILS # BLD AUTO: 0.05 X10(3) UL (ref 0–0.2)
BASOPHILS NFR BLD AUTO: 0.8 %
BILIRUB SERPL-MCNC: 0.6 MG/DL (ref 0.1–2)
BUN BLD-MCNC: 13 MG/DL (ref 7–18)
BUN/CREAT SERPL: 15.1 (ref 10–20)
CALCIUM BLD-MCNC: 9.3 MG/DL (ref 8.5–10.1)
CHLORIDE SERPL-SCNC: 105 MMOL/L (ref 98–112)
CO2 SERPL-SCNC: 28 MMOL/L (ref 21–32)
CREAT BLD-MCNC: 0.86 MG/DL
DEPRECATED RDW RBC AUTO: 52.9 FL (ref 35.1–46.3)
EOSINOPHIL # BLD AUTO: 0.12 X10(3) UL (ref 0–0.7)
EOSINOPHIL NFR BLD AUTO: 2 %
ERYTHROCYTE [DISTWIDTH] IN BLOOD BY AUTOMATED COUNT: 13.7 % (ref 11–15)
GLOBULIN PLAS-MCNC: 3.1 G/DL (ref 2.8–4.4)
GLUCOSE BLD-MCNC: 85 MG/DL (ref 70–99)
HCT VFR BLD AUTO: 41.1 %
HGB BLD-MCNC: 13.7 G/DL
IMM GRANULOCYTES # BLD AUTO: 0.06 X10(3) UL (ref 0–1)
IMM GRANULOCYTES NFR BLD: 1 %
LDH SERPL L TO P-CCNC: 167 U/L
LYMPHOCYTES # BLD AUTO: 1.1 X10(3) UL (ref 1–4)
LYMPHOCYTES NFR BLD AUTO: 18 %
M PROTEIN MFR SERPL ELPH: 6.8 G/DL (ref 6.4–8.2)
MCH RBC QN AUTO: 35.3 PG (ref 26–34)
MCHC RBC AUTO-ENTMCNC: 33.3 G/DL (ref 31–37)
MCV RBC AUTO: 105.9 FL
MONOCYTES # BLD AUTO: 0.63 X10(3) UL (ref 0.1–1)
MONOCYTES NFR BLD AUTO: 10.3 %
NEUTROPHILS # BLD AUTO: 4.15 X10 (3) UL (ref 1.5–7.7)
NEUTROPHILS # BLD AUTO: 4.15 X10(3) UL (ref 1.5–7.7)
NEUTROPHILS NFR BLD AUTO: 67.9 %
OSMOLALITY SERPL CALC.SUM OF ELEC: 285 MOSM/KG (ref 275–295)
PATIENT FASTING Y/N/NP: YES
PLATELET # BLD AUTO: 470 10(3)UL (ref 150–450)
POTASSIUM SERPL-SCNC: 4.1 MMOL/L (ref 3.5–5.1)
RBC # BLD AUTO: 3.88 X10(6)UL
SODIUM SERPL-SCNC: 138 MMOL/L (ref 136–145)
WBC # BLD AUTO: 6.1 X10(3) UL (ref 4–11)

## 2021-07-02 PROCEDURE — 85025 COMPLETE CBC W/AUTO DIFF WBC: CPT

## 2021-07-02 PROCEDURE — 36415 COLL VENOUS BLD VENIPUNCTURE: CPT

## 2021-07-02 PROCEDURE — 80053 COMPREHEN METABOLIC PANEL: CPT

## 2021-07-02 PROCEDURE — 83615 LACTATE (LD) (LDH) ENZYME: CPT

## 2021-11-02 ENCOUNTER — LAB ENCOUNTER (OUTPATIENT)
Dept: LAB | Facility: HOSPITAL | Age: 76
End: 2021-11-02
Attending: OTOLARYNGOLOGY
Payer: MEDICARE

## 2021-11-02 DIAGNOSIS — E07.9 THYROID DYSFUNCTION: ICD-10-CM

## 2021-11-02 PROCEDURE — 84439 ASSAY OF FREE THYROXINE: CPT

## 2021-11-02 PROCEDURE — 84443 ASSAY THYROID STIM HORMONE: CPT

## 2021-11-02 PROCEDURE — 36415 COLL VENOUS BLD VENIPUNCTURE: CPT

## 2021-12-10 ENCOUNTER — APPOINTMENT (OUTPATIENT)
Dept: HEMATOLOGY/ONCOLOGY | Facility: HOSPITAL | Age: 76
End: 2021-12-10
Attending: INTERNAL MEDICINE
Payer: MEDICARE

## 2021-12-14 ENCOUNTER — OFFICE VISIT (OUTPATIENT)
Dept: HEMATOLOGY/ONCOLOGY | Facility: HOSPITAL | Age: 76
End: 2021-12-14
Attending: INTERNAL MEDICINE
Payer: MEDICARE

## 2021-12-14 VITALS
BODY MASS INDEX: 26.81 KG/M2 | WEIGHT: 181 LBS | TEMPERATURE: 97 F | SYSTOLIC BLOOD PRESSURE: 123 MMHG | HEIGHT: 69.02 IN | OXYGEN SATURATION: 99 % | HEART RATE: 62 BPM | RESPIRATION RATE: 16 BRPM | DIASTOLIC BLOOD PRESSURE: 76 MMHG

## 2021-12-14 DIAGNOSIS — D47.3 ESSENTIAL THROMBOCYTOSIS (HCC): Primary | ICD-10-CM

## 2021-12-14 PROCEDURE — 99214 OFFICE O/P EST MOD 30 MIN: CPT | Performed by: INTERNAL MEDICINE

## 2021-12-14 RX ORDER — HYDROXYUREA 500 MG/1
500 CAPSULE ORAL
Qty: 90 CAPSULE | Refills: 3 | Status: SHIPPED | OUTPATIENT
Start: 2021-12-14

## 2021-12-14 NOTE — PROGRESS NOTES
Pt here for yearly MD f/u. Energy level is up and down, appetite is good. Pt exercises three times/week. Pt has arthritic pain. 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 Pt has trouble sleeping.      Outpatient Oncology Care Plan  Problem list:  fatigue  knowledge deficit

## 2021-12-28 NOTE — PROGRESS NOTES
[facility]    PATIENT'S NAME: Nessa Felipe   ATTENDING PHYSICIAN: Mariaa Morris M.D.    PATIENT ACCOUNT #: [de-identified] LOCATION: [address] [place] [address] RECORD #: [id number] YOB: 1945   DATE OF SERVICE: 12/14/2021       CANCER CENTER hepatosplenomegaly or tenderness. EXTREMITIES:  No clubbing, cyanosis, or edema. NEUROLOGIC:  Intact. LABORATORY DATA:  His white count is 5.8, hemoglobin 13.5, platelets are 493.   His chemistries are completely normal.    IMPRESSION:  Essential throm

## 2022-04-15 ENCOUNTER — APPOINTMENT (OUTPATIENT)
Dept: CARDIOLOGY | Age: 77
End: 2022-04-15

## 2022-04-18 ENCOUNTER — LAB ENCOUNTER (OUTPATIENT)
Dept: LAB | Facility: HOSPITAL | Age: 77
End: 2022-04-18
Attending: OTOLARYNGOLOGY
Payer: MEDICARE

## 2022-04-18 DIAGNOSIS — D47.3 ESSENTIAL THROMBOCYTOSIS (HCC): ICD-10-CM

## 2022-04-18 DIAGNOSIS — E07.9 THYROID DYSFUNCTION: ICD-10-CM

## 2022-04-18 LAB
T4 FREE SERPL-MCNC: 1.2 NG/DL (ref 0.8–1.7)
TSI SER-ACNC: 0.83 MIU/ML (ref 0.36–3.74)

## 2022-04-18 PROCEDURE — 36415 COLL VENOUS BLD VENIPUNCTURE: CPT

## 2022-04-18 PROCEDURE — 84439 ASSAY OF FREE THYROXINE: CPT

## 2022-04-18 PROCEDURE — 84443 ASSAY THYROID STIM HORMONE: CPT

## 2022-06-13 ENCOUNTER — NURSE ONLY (OUTPATIENT)
Dept: HEMATOLOGY/ONCOLOGY | Facility: HOSPITAL | Age: 77
End: 2022-06-13
Attending: INTERNAL MEDICINE
Payer: MEDICARE

## 2022-06-13 DIAGNOSIS — E07.9 THYROID DYSFUNCTION: ICD-10-CM

## 2022-06-13 DIAGNOSIS — D47.3 ESSENTIAL THROMBOCYTOSIS (HCC): ICD-10-CM

## 2022-06-13 LAB
ALBUMIN SERPL-MCNC: 3.5 G/DL (ref 3.4–5)
ALBUMIN/GLOB SERPL: 1 {RATIO} (ref 1–2)
ALP LIVER SERPL-CCNC: 96 U/L
ALT SERPL-CCNC: 19 U/L
ANION GAP SERPL CALC-SCNC: 5 MMOL/L (ref 0–18)
AST SERPL-CCNC: 17 U/L (ref 15–37)
BASOPHILS # BLD AUTO: 0.07 X10(3) UL (ref 0–0.2)
BASOPHILS NFR BLD AUTO: 1.1 %
BILIRUB SERPL-MCNC: 0.6 MG/DL (ref 0.1–2)
BUN BLD-MCNC: 17 MG/DL (ref 7–18)
CALCIUM BLD-MCNC: 9.5 MG/DL (ref 8.5–10.1)
CHLORIDE SERPL-SCNC: 104 MMOL/L (ref 98–112)
CO2 SERPL-SCNC: 26 MMOL/L (ref 21–32)
CREAT BLD-MCNC: 0.92 MG/DL
EOSINOPHIL # BLD AUTO: 0.13 X10(3) UL (ref 0–0.7)
EOSINOPHIL NFR BLD AUTO: 2 %
ERYTHROCYTE [DISTWIDTH] IN BLOOD BY AUTOMATED COUNT: 13 %
FASTING STATUS PATIENT QL REPORTED: NO
GLOBULIN PLAS-MCNC: 3.4 G/DL (ref 2.8–4.4)
GLUCOSE BLD-MCNC: 88 MG/DL (ref 70–99)
HCT VFR BLD AUTO: 40.9 %
HGB BLD-MCNC: 13.1 G/DL
IMM GRANULOCYTES # BLD AUTO: 0.04 X10(3) UL (ref 0–1)
IMM GRANULOCYTES NFR BLD: 0.6 %
LDH SERPL L TO P-CCNC: 171 U/L
LYMPHOCYTES # BLD AUTO: 1.2 X10(3) UL (ref 1–4)
LYMPHOCYTES NFR BLD AUTO: 18.4 %
MCH RBC QN AUTO: 35.2 PG (ref 26–34)
MCHC RBC AUTO-ENTMCNC: 32 G/DL (ref 31–37)
MCV RBC AUTO: 109.9 FL
MONOCYTES # BLD AUTO: 0.72 X10(3) UL (ref 0.1–1)
MONOCYTES NFR BLD AUTO: 11 %
NEUTROPHILS # BLD AUTO: 4.36 X10 (3) UL (ref 1.5–7.7)
NEUTROPHILS # BLD AUTO: 4.36 X10(3) UL (ref 1.5–7.7)
NEUTROPHILS NFR BLD AUTO: 66.9 %
OSMOLALITY SERPL CALC.SUM OF ELEC: 281 MOSM/KG (ref 275–295)
PLATELET # BLD AUTO: 441 10(3)UL (ref 150–450)
POTASSIUM SERPL-SCNC: 4.4 MMOL/L (ref 3.5–5.1)
PROT SERPL-MCNC: 6.9 G/DL (ref 6.4–8.2)
RBC # BLD AUTO: 3.72 X10(6)UL
SODIUM SERPL-SCNC: 135 MMOL/L (ref 136–145)
WBC # BLD AUTO: 6.5 X10(3) UL (ref 4–11)

## 2022-06-13 PROCEDURE — 83615 LACTATE (LD) (LDH) ENZYME: CPT

## 2022-06-13 PROCEDURE — 85025 COMPLETE CBC W/AUTO DIFF WBC: CPT

## 2022-06-13 PROCEDURE — 80053 COMPREHEN METABOLIC PANEL: CPT

## 2022-06-13 PROCEDURE — 36415 COLL VENOUS BLD VENIPUNCTURE: CPT

## 2022-06-23 ENCOUNTER — APPOINTMENT (OUTPATIENT)
Dept: GENERAL RADIOLOGY | Age: 77
End: 2022-06-23
Attending: NURSE PRACTITIONER
Payer: MEDICARE

## 2022-06-23 ENCOUNTER — HOSPITAL ENCOUNTER (OUTPATIENT)
Age: 77
Discharge: HOME | End: 2022-06-23
Payer: MEDICARE

## 2022-06-23 VITALS
OXYGEN SATURATION: 97 % | HEART RATE: 83 BPM | SYSTOLIC BLOOD PRESSURE: 131 MMHG | RESPIRATION RATE: 16 BRPM | DIASTOLIC BLOOD PRESSURE: 70 MMHG | TEMPERATURE: 98 F

## 2022-06-23 DIAGNOSIS — S62.521A CLOSED DISPLACED FRACTURE OF DISTAL PHALANX OF RIGHT THUMB, INITIAL ENCOUNTER: Primary | ICD-10-CM

## 2022-06-23 PROCEDURE — 99203 OFFICE O/P NEW LOW 30 MIN: CPT

## 2022-06-23 PROCEDURE — 99213 OFFICE O/P EST LOW 20 MIN: CPT

## 2022-06-23 PROCEDURE — 73140 X-RAY EXAM OF FINGER(S): CPT | Performed by: NURSE PRACTITIONER

## 2022-06-23 PROCEDURE — 26750 TREAT FINGER FRACTURE EACH: CPT
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
## 2022-12-14 ENCOUNTER — NURSE ONLY (OUTPATIENT)
Dept: HEMATOLOGY/ONCOLOGY | Facility: HOSPITAL | Age: 77
End: 2022-12-14
Attending: INTERNAL MEDICINE
Payer: MEDICARE

## 2022-12-14 ENCOUNTER — TELEPHONE (OUTPATIENT)
Dept: HEMATOLOGY/ONCOLOGY | Facility: HOSPITAL | Age: 77
End: 2022-12-14

## 2022-12-14 DIAGNOSIS — D64.9 ANEMIA, UNSPECIFIED TYPE: ICD-10-CM

## 2022-12-14 DIAGNOSIS — D47.3 ESSENTIAL THROMBOCYTOSIS (HCC): ICD-10-CM

## 2022-12-14 LAB
ALBUMIN SERPL-MCNC: 3.7 G/DL (ref 3.4–5)
ALBUMIN/GLOB SERPL: 1 {RATIO} (ref 1–2)
ALP LIVER SERPL-CCNC: 92 U/L
ALT SERPL-CCNC: 24 U/L
ANION GAP SERPL CALC-SCNC: 3 MMOL/L (ref 0–18)
AST SERPL-CCNC: 19 U/L (ref 15–37)
BASOPHILS # BLD AUTO: 0.04 X10(3) UL (ref 0–0.2)
BASOPHILS NFR BLD AUTO: 0.6 %
BILIRUB SERPL-MCNC: 0.5 MG/DL (ref 0.1–2)
BUN BLD-MCNC: 15 MG/DL (ref 7–18)
CALCIUM BLD-MCNC: 9.6 MG/DL (ref 8.5–10.1)
CHLORIDE SERPL-SCNC: 107 MMOL/L (ref 98–112)
CO2 SERPL-SCNC: 26 MMOL/L (ref 21–32)
CREAT BLD-MCNC: 0.94 MG/DL
EOSINOPHIL # BLD AUTO: 0.03 X10(3) UL (ref 0–0.7)
EOSINOPHIL NFR BLD AUTO: 0.5 %
ERYTHROCYTE [DISTWIDTH] IN BLOOD BY AUTOMATED COUNT: 13.1 %
FASTING STATUS PATIENT QL REPORTED: NO
GFR SERPLBLD BASED ON 1.73 SQ M-ARVRAT: 82 ML/MIN/1.73M2 (ref 60–?)
GLOBULIN PLAS-MCNC: 3.6 G/DL (ref 2.8–4.4)
GLUCOSE BLD-MCNC: 55 MG/DL (ref 70–99)
HCT VFR BLD AUTO: 39.3 %
HGB BLD-MCNC: 12.7 G/DL
IMM GRANULOCYTES # BLD AUTO: 0.04 X10(3) UL (ref 0–1)
IMM GRANULOCYTES NFR BLD: 0.6 %
LDH SERPL L TO P-CCNC: 171 U/L
LYMPHOCYTES # BLD AUTO: 1.01 X10(3) UL (ref 1–4)
LYMPHOCYTES NFR BLD AUTO: 15.4 %
MCH RBC QN AUTO: 35 PG (ref 26–34)
MCHC RBC AUTO-ENTMCNC: 32.3 G/DL (ref 31–37)
MCV RBC AUTO: 108.3 FL
MONOCYTES # BLD AUTO: 0.79 X10(3) UL (ref 0.1–1)
MONOCYTES NFR BLD AUTO: 12 %
NEUTROPHILS # BLD AUTO: 4.65 X10 (3) UL (ref 1.5–7.7)
NEUTROPHILS # BLD AUTO: 4.65 X10(3) UL (ref 1.5–7.7)
NEUTROPHILS NFR BLD AUTO: 70.9 %
OSMOLALITY SERPL CALC.SUM OF ELEC: 280 MOSM/KG (ref 275–295)
PLATELET # BLD AUTO: 421 10(3)UL (ref 150–450)
POTASSIUM SERPL-SCNC: 4.2 MMOL/L (ref 3.5–5.1)
PROT SERPL-MCNC: 7.3 G/DL (ref 6.4–8.2)
RBC # BLD AUTO: 3.63 X10(6)UL
SODIUM SERPL-SCNC: 136 MMOL/L (ref 136–145)
WBC # BLD AUTO: 6.6 X10(3) UL (ref 4–11)

## 2022-12-14 PROCEDURE — 83540 ASSAY OF IRON: CPT

## 2022-12-14 PROCEDURE — 83615 LACTATE (LD) (LDH) ENZYME: CPT

## 2022-12-14 PROCEDURE — 85025 COMPLETE CBC W/AUTO DIFF WBC: CPT

## 2022-12-14 PROCEDURE — 80053 COMPREHEN METABOLIC PANEL: CPT

## 2022-12-14 PROCEDURE — 83550 IRON BINDING TEST: CPT

## 2022-12-14 PROCEDURE — 36415 COLL VENOUS BLD VENIPUNCTURE: CPT

## 2022-12-14 PROCEDURE — 82728 ASSAY OF FERRITIN: CPT

## 2022-12-14 NOTE — TELEPHONE ENCOUNTER
Patient returned call. Informed patient of blood sugar of 55 from this morning. Patient denies feeling ill. No dizziness or weakness. Patient states he feels fine and denies any current issues. Informed patient, will send results to his PCP to review. If patient were to have any concerns, he can contact his PCP or go to the ER. Patient verbalized understanding.

## 2022-12-14 NOTE — TELEPHONE ENCOUNTER
LM for patient on personal cell number. Glucose 55 this am- courtesy call from Case parra in lab. Pt to call back with any issues- generic info about low blood sugar left on VM.

## 2022-12-16 ENCOUNTER — TELEPHONE (OUTPATIENT)
Dept: HEMATOLOGY/ONCOLOGY | Facility: HOSPITAL | Age: 77
End: 2022-12-16

## 2022-12-16 ENCOUNTER — OFFICE VISIT (OUTPATIENT)
Dept: HEMATOLOGY/ONCOLOGY | Facility: HOSPITAL | Age: 77
End: 2022-12-16
Attending: INTERNAL MEDICINE
Payer: MEDICARE

## 2022-12-16 VITALS
SYSTOLIC BLOOD PRESSURE: 127 MMHG | HEART RATE: 62 BPM | WEIGHT: 177.63 LBS | BODY MASS INDEX: 26 KG/M2 | TEMPERATURE: 97 F | RESPIRATION RATE: 16 BRPM | OXYGEN SATURATION: 100 % | DIASTOLIC BLOOD PRESSURE: 76 MMHG

## 2022-12-16 DIAGNOSIS — D50.0 IRON DEFICIENCY ANEMIA DUE TO CHRONIC BLOOD LOSS: ICD-10-CM

## 2022-12-16 DIAGNOSIS — D64.9 ANEMIA, UNSPECIFIED TYPE: Primary | ICD-10-CM

## 2022-12-16 DIAGNOSIS — D47.3 ESSENTIAL THROMBOCYTOSIS (HCC): ICD-10-CM

## 2022-12-16 LAB
DEPRECATED HBV CORE AB SER IA-ACNC: 19.5 NG/ML
IRON SATN MFR SERPL: 14 %
IRON SERPL-MCNC: 65 UG/DL
TIBC SERPL-MCNC: 453 UG/DL (ref 240–450)
TRANSFERRIN SERPL-MCNC: 304 MG/DL (ref 200–360)

## 2022-12-16 PROCEDURE — 99214 OFFICE O/P EST MOD 30 MIN: CPT | Performed by: INTERNAL MEDICINE

## 2022-12-16 RX ORDER — HYDROXYUREA 500 MG/1
500 CAPSULE ORAL
Qty: 90 CAPSULE | Refills: 3 | Status: SHIPPED | OUTPATIENT
Start: 2022-12-16

## 2022-12-16 NOTE — PROGRESS NOTES
Patient here for 1yr follow up. On eliquis and hydrea. Needs refill on hydrea. No signs of bleeding/bruising. Wants to review labs from 2 days ago. Otherwise, no new issues.     Education Record    Learner:  Patient and Spouse    Disease / Diagnosis: Macrocytosis    Barriers / Limitations:  None   Comments:    Method:  Discussion   Comments:    General Topics:  Plan of care reviewed   Comments:    Outcome:  Shows understanding   Comments:

## 2022-12-16 NOTE — TELEPHONE ENCOUNTER
Patient is calling because he is wondering if his colonoscopy is needed right away.  Call back number is [phone]

## 2022-12-16 NOTE — TELEPHONE ENCOUNTER
Spoke with patient. Dr Adam Cash wants patient to get a colonoscopy within the next 3 months. Patient verbalized understanding.

## 2022-12-16 NOTE — TELEPHONE ENCOUNTER
Left voicemail on his phone. He is iron deficient and needs an iron infusion. Also will need repeat GI eval - Dr Mariza Moody is his Gi doc. Could see him or a partner. Could also see a GI doc in the [place]. [facility] will help arrange the infed infusion.      Cole

## 2022-12-16 NOTE — TELEPHONE ENCOUNTER
Left patient a voicemail message letting him know he needs IV iron and a GI evaluation. The GI evaluation can be done with his local physician or while in the [place]. Message sent to PSRs to schedule patient.

## 2022-12-17 NOTE — PROGRESS NOTES
[facility]    PATIENT'S NAME: Leanna Christianson   ATTENDING PHYSICIAN: Cristina Rutledge M.D. PATIENT ACCOUNT #: [de-identified] LOCATION: [address] [place] [address] RECORD #: [id number] YOB: 1945   DATE OF SERVICE: 12/16/2022       CANCER CENTER PROGRESS NOTE    CHIEF COMPLAINT:  Followup for history of essential thrombocytosis. HISTORY OF PRESENT ILLNESS:  The patient is a 77-year-old male. He has a relatively long history of essential thrombocytosis. He has a JAK2 mutation. He does not have leukocytosis. He does not have any hemoglobin issues. He has been on a low dose of hydroxyurea. His platelets have been well maintained. He has had modest macrocytosis. He has only been getting blood drawn every 6 months. He has had this diagnosis now for many years and he has had no real evidence of progression to a more aggressive myeloproliferative neoplasm. He returns today feeling well. He has no specific complaints at present. He has no bone pain, cough, shortness of breath, abdominal pain, nausea, or vomiting. He has no bleeding or bruising. MEDICATIONS:  His current medications include a probiotic daily; apixaban 5 mg b.i.d.; atorvastatin 40 mg daily; calcium 500/125 units of vitamin D daily; econazole nitrate topical cream p.r.n.; flecainide 50 mg b.i.d.; folic acid 497 mcg daily; hydroxyurea 500 mg daily; levothyroxine 88 mcg daily; lorazepam 0.5 mg nightly p.r.n.; metoprolol succinate extended release 25 mg daily; omeprazole 20 mg daily; Levitra 20 mg p.r.n.; and vitamin D3 of 1000 units daily. PHYSICAL EXAMINATION:    GENERAL:  He is a well-appearing male, in no acute distress. VITAL SIGNS:  His performance status is 0. His weight is 177 pounds. Blood pressure is 127/76, pulse is 62, respiratory rate is 20, temperature is 97.3. HEENT:  Unremarkable. LYMPHATICS:  He has no adenopathy. LUNGS:  Clear.   HEART:  Normal.  ABDOMEN:  No hepatosplenomegaly or tenderness. EXTREMITIES:  He has no clubbing, cyanosis, or edema. NEUROLOGIC:  He is intact. LABORATORY DATA:  His hemoglobin is slightly low today at 12.7; otherwise, white count and platelet count are normal.  Platelets are 088. I checked his iron studies. His iron saturation is slightly low and his ferritin is slightly low. IMPRESSION:    1. Essential thrombocytosis. This is stable. He is to stay on the same dose of hydroxyurea. There is no evidence of progression to a more aggressive myeloproliferative neoplasm. 2.   Iron deficiency. We will replete his iron with IV iron. He has been anticoagulated for atrial fibrillation and it is conceivable that he may have leakage of blood from small AVMs in the GI tract. His last colonoscopy was in 2018. He should be referred for an EGD and a colonoscopy. He and his wife are about to leave for [facility] for several months. I told him it is unlikely he will be able to get in prior to leaving. He could do it down there or he could do it with Dr. Philip Camacho once he gets back. He is going to work on this. Dictated By Martir Gleason M.D.  d: 12/16/2022 16:53:55  t: 12/17/2022 08:31:47  Job [id number]  CF/    cc: Coral [place] M.D.    Alison Mckeon M.D.

## 2022-12-20 ENCOUNTER — OFFICE VISIT (OUTPATIENT)
Dept: HEMATOLOGY/ONCOLOGY | Facility: HOSPITAL | Age: 77
End: 2022-12-20
Attending: INTERNAL MEDICINE
Payer: MEDICARE

## 2022-12-20 VITALS
OXYGEN SATURATION: 98 % | DIASTOLIC BLOOD PRESSURE: 69 MMHG | RESPIRATION RATE: 16 BRPM | HEART RATE: 60 BPM | SYSTOLIC BLOOD PRESSURE: 108 MMHG | TEMPERATURE: 98 F

## 2022-12-20 DIAGNOSIS — D50.0 IRON DEFICIENCY ANEMIA DUE TO CHRONIC BLOOD LOSS: Primary | ICD-10-CM

## 2022-12-20 PROCEDURE — 96365 THER/PROPH/DIAG IV INF INIT: CPT

## 2022-12-20 PROCEDURE — 96376 TX/PRO/DX INJ SAME DRUG ADON: CPT

## 2022-12-20 NOTE — PROGRESS NOTES
Infed infused without any complications. Observed for half hour after infusion. Vital signs taken at the end of the 30-minute observation. Patient denied any complaints/issues. Discharged in good condition. Advised to call for any questions/concerns/issues.

## 2023-03-03 ENCOUNTER — OFFICE VISIT (OUTPATIENT)
Dept: HEMATOLOGY/ONCOLOGY | Facility: HOSPITAL | Age: 78
End: 2023-03-03
Attending: INTERNAL MEDICINE
Payer: MEDICARE

## 2023-03-03 VITALS
SYSTOLIC BLOOD PRESSURE: 113 MMHG | DIASTOLIC BLOOD PRESSURE: 74 MMHG | BODY MASS INDEX: 26.24 KG/M2 | OXYGEN SATURATION: 97 % | HEIGHT: 69.02 IN | HEART RATE: 63 BPM | TEMPERATURE: 97 F | RESPIRATION RATE: 16 BRPM | WEIGHT: 177.19 LBS

## 2023-03-03 DIAGNOSIS — D50.0 IRON DEFICIENCY ANEMIA DUE TO CHRONIC BLOOD LOSS: ICD-10-CM

## 2023-03-03 DIAGNOSIS — D47.3 ESSENTIAL THROMBOCYTOSIS (HCC): ICD-10-CM

## 2023-03-03 DIAGNOSIS — D64.9 ANEMIA, UNSPECIFIED TYPE: ICD-10-CM

## 2023-03-03 LAB
ALBUMIN SERPL-MCNC: 3.7 G/DL (ref 3.4–5)
ALBUMIN/GLOB SERPL: 1.1 {RATIO} (ref 1–2)
ALP LIVER SERPL-CCNC: 86 U/L
ALT SERPL-CCNC: 31 U/L
ANION GAP SERPL CALC-SCNC: 4 MMOL/L (ref 0–18)
AST SERPL-CCNC: 23 U/L (ref 15–37)
BASOPHILS # BLD AUTO: 0.06 X10(3) UL (ref 0–0.2)
BASOPHILS NFR BLD AUTO: 1.1 %
BILIRUB SERPL-MCNC: 0.5 MG/DL (ref 0.1–2)
BUN BLD-MCNC: 12 MG/DL (ref 7–18)
CALCIUM BLD-MCNC: 9.4 MG/DL (ref 8.5–10.1)
CHLORIDE SERPL-SCNC: 107 MMOL/L (ref 98–112)
CO2 SERPL-SCNC: 24 MMOL/L (ref 21–32)
CREAT BLD-MCNC: 0.9 MG/DL
DEPRECATED HBV CORE AB SER IA-ACNC: 179.4 NG/ML
EOSINOPHIL # BLD AUTO: 0.12 X10(3) UL (ref 0–0.7)
EOSINOPHIL NFR BLD AUTO: 2.1 %
ERYTHROCYTE [DISTWIDTH] IN BLOOD BY AUTOMATED COUNT: 14 %
GFR SERPLBLD BASED ON 1.73 SQ M-ARVRAT: 87 ML/MIN/1.73M2 (ref 60–?)
GLOBULIN PLAS-MCNC: 3.3 G/DL (ref 2.8–4.4)
GLUCOSE BLD-MCNC: 109 MG/DL (ref 70–99)
HCT VFR BLD AUTO: 40.4 %
HGB BLD-MCNC: 13.4 G/DL
IMM GRANULOCYTES # BLD AUTO: 0.05 X10(3) UL (ref 0–1)
IMM GRANULOCYTES NFR BLD: 0.9 %
IRON SATN MFR SERPL: 22 %
IRON SERPL-MCNC: 75 UG/DL
LDH SERPL L TO P-CCNC: 177 U/L
LYMPHOCYTES # BLD AUTO: 1.16 X10(3) UL (ref 1–4)
LYMPHOCYTES NFR BLD AUTO: 20.4 %
MCH RBC QN AUTO: 34.9 PG (ref 26–34)
MCHC RBC AUTO-ENTMCNC: 33.2 G/DL (ref 31–37)
MCV RBC AUTO: 105.2 FL
MONOCYTES # BLD AUTO: 0.5 X10(3) UL (ref 0.1–1)
MONOCYTES NFR BLD AUTO: 8.8 %
NEUTROPHILS # BLD AUTO: 3.81 X10 (3) UL (ref 1.5–7.7)
NEUTROPHILS # BLD AUTO: 3.81 X10(3) UL (ref 1.5–7.7)
NEUTROPHILS NFR BLD AUTO: 66.7 %
OSMOLALITY SERPL CALC.SUM OF ELEC: 280 MOSM/KG (ref 275–295)
PLATELET # BLD AUTO: 460 10(3)UL (ref 150–450)
POTASSIUM SERPL-SCNC: 3.8 MMOL/L (ref 3.5–5.1)
PROT SERPL-MCNC: 7 G/DL (ref 6.4–8.2)
RBC # BLD AUTO: 3.84 X10(6)UL
SODIUM SERPL-SCNC: 135 MMOL/L (ref 136–145)
TIBC SERPL-MCNC: 347 UG/DL (ref 240–450)
TRANSFERRIN SERPL-MCNC: 233 MG/DL (ref 200–360)
VIT B12 SERPL-MCNC: 651 PG/ML (ref 193–986)
WBC # BLD AUTO: 5.7 X10(3) UL (ref 4–11)

## 2023-03-03 PROCEDURE — 99214 OFFICE O/P EST MOD 30 MIN: CPT | Performed by: INTERNAL MEDICINE

## 2023-03-03 NOTE — PROGRESS NOTES
Patient is here for MD f/u for Anemia. Patient had a colonoscopy at the end of December. It was recommended patient do a capsule endoscopy. He would like Dr Lizbeth Hunter recommendation for this. Denies any bleeding. Patient reports he is feeling well. He tweaked his back recently and occasionally has pain but it has not affected his ADLs.        Education Record    Learner:  Patient    Disease / Diagnosis:  Anemia/Essential thrombocytosis    Barriers / Limitations:  None   Comments:    Method:  Discussion   Comments:    General Topics:  Plan of care reviewed   Comments:    Outcome:  Shows understanding   Comments:

## 2023-03-10 ENCOUNTER — APPOINTMENT (OUTPATIENT)
Dept: HEMATOLOGY/ONCOLOGY | Facility: HOSPITAL | Age: 78
End: 2023-03-10
Attending: INTERNAL MEDICINE
Payer: MEDICARE

## 2023-04-05 ENCOUNTER — LAB ENCOUNTER (OUTPATIENT)
Dept: LAB | Facility: HOSPITAL | Age: 78
End: 2023-04-05
Attending: OTOLARYNGOLOGY
Payer: MEDICARE

## 2023-04-05 DIAGNOSIS — E07.9 DISEASE OF THYROID GLAND: Primary | ICD-10-CM

## 2023-04-05 LAB
T4 FREE SERPL-MCNC: 1.3 NG/DL (ref 0.8–1.7)
TSI SER-ACNC: 1.39 MIU/ML (ref 0.36–3.74)

## 2023-04-05 PROCEDURE — 84439 ASSAY OF FREE THYROXINE: CPT

## 2023-04-05 PROCEDURE — 84443 ASSAY THYROID STIM HORMONE: CPT

## 2023-04-05 PROCEDURE — 36415 COLL VENOUS BLD VENIPUNCTURE: CPT

## 2023-06-02 ENCOUNTER — NURSE ONLY (OUTPATIENT)
Dept: HEMATOLOGY/ONCOLOGY | Facility: HOSPITAL | Age: 78
End: 2023-06-02
Attending: INTERNAL MEDICINE
Payer: MEDICARE

## 2023-06-02 DIAGNOSIS — D50.0 IRON DEFICIENCY ANEMIA DUE TO CHRONIC BLOOD LOSS: Primary | ICD-10-CM

## 2023-06-02 LAB
ALBUMIN SERPL-MCNC: 3.6 G/DL (ref 3.4–5)
ALBUMIN/GLOB SERPL: 1 {RATIO} (ref 1–2)
ALP LIVER SERPL-CCNC: 93 U/L
ALT SERPL-CCNC: 27 U/L
ANION GAP SERPL CALC-SCNC: 5 MMOL/L (ref 0–18)
AST SERPL-CCNC: 20 U/L (ref 15–37)
BASOPHILS # BLD AUTO: 0.08 X10(3) UL (ref 0–0.2)
BASOPHILS NFR BLD AUTO: 1.3 %
BILIRUB SERPL-MCNC: 0.7 MG/DL (ref 0.1–2)
BUN BLD-MCNC: 14 MG/DL (ref 7–18)
CALCIUM BLD-MCNC: 9.2 MG/DL (ref 8.5–10.1)
CHLORIDE SERPL-SCNC: 107 MMOL/L (ref 98–112)
CO2 SERPL-SCNC: 23 MMOL/L (ref 21–32)
CREAT BLD-MCNC: 1.05 MG/DL
DEPRECATED HBV CORE AB SER IA-ACNC: 143.4 NG/ML
EOSINOPHIL # BLD AUTO: 0.12 X10(3) UL (ref 0–0.7)
EOSINOPHIL NFR BLD AUTO: 2 %
ERYTHROCYTE [DISTWIDTH] IN BLOOD BY AUTOMATED COUNT: 12.9 %
GFR SERPLBLD BASED ON 1.73 SQ M-ARVRAT: 72 ML/MIN/1.73M2 (ref 60–?)
GLOBULIN PLAS-MCNC: 3.6 G/DL (ref 2.8–4.4)
GLUCOSE BLD-MCNC: 124 MG/DL (ref 70–99)
HCT VFR BLD AUTO: 40.5 %
HGB BLD-MCNC: 13.6 G/DL
IMM GRANULOCYTES # BLD AUTO: 0.07 X10(3) UL (ref 0–1)
IMM GRANULOCYTES NFR BLD: 1.2 %
IRON SATN MFR SERPL: 23 %
IRON SERPL-MCNC: 82 UG/DL
LYMPHOCYTES # BLD AUTO: 1.11 X10(3) UL (ref 1–4)
LYMPHOCYTES NFR BLD AUTO: 18.6 %
MCH RBC QN AUTO: 36.3 PG (ref 26–34)
MCHC RBC AUTO-ENTMCNC: 33.6 G/DL (ref 31–37)
MCV RBC AUTO: 108 FL
MONOCYTES # BLD AUTO: 0.36 X10(3) UL (ref 0.1–1)
MONOCYTES NFR BLD AUTO: 6 %
NEUTROPHILS # BLD AUTO: 4.24 X10 (3) UL (ref 1.5–7.7)
NEUTROPHILS # BLD AUTO: 4.24 X10(3) UL (ref 1.5–7.7)
NEUTROPHILS NFR BLD AUTO: 70.9 %
OSMOLALITY SERPL CALC.SUM OF ELEC: 282 MOSM/KG (ref 275–295)
PLATELET # BLD AUTO: 455 10(3)UL (ref 150–450)
POTASSIUM SERPL-SCNC: 4 MMOL/L (ref 3.5–5.1)
PROT SERPL-MCNC: 7.2 G/DL (ref 6.4–8.2)
RBC # BLD AUTO: 3.75 X10(6)UL
SODIUM SERPL-SCNC: 135 MMOL/L (ref 136–145)
TIBC SERPL-MCNC: 353 UG/DL (ref 240–450)
TRANSFERRIN SERPL-MCNC: 237 MG/DL (ref 200–360)
WBC # BLD AUTO: 6 X10(3) UL (ref 4–11)

## 2023-06-02 PROCEDURE — 83540 ASSAY OF IRON: CPT

## 2023-06-02 PROCEDURE — 82728 ASSAY OF FERRITIN: CPT

## 2023-06-02 PROCEDURE — 80053 COMPREHEN METABOLIC PANEL: CPT

## 2023-06-02 PROCEDURE — 83550 IRON BINDING TEST: CPT

## 2023-06-02 PROCEDURE — 36415 COLL VENOUS BLD VENIPUNCTURE: CPT

## 2023-06-02 PROCEDURE — 85025 COMPLETE CBC W/AUTO DIFF WBC: CPT

## 2023-06-06 ENCOUNTER — LAB ENCOUNTER (OUTPATIENT)
Dept: LAB | Age: 78
End: 2023-06-06
Attending: DERMATOLOGY
Payer: MEDICARE

## 2023-06-06 DIAGNOSIS — A69.20 LYME DISEASE: Primary | ICD-10-CM

## 2023-06-06 PROCEDURE — 36415 COLL VENOUS BLD VENIPUNCTURE: CPT

## 2023-06-06 PROCEDURE — 86618 LYME DISEASE ANTIBODY: CPT

## 2023-06-07 LAB — B BURGDOR IGG+IGM SER QL: NEGATIVE

## 2023-06-27 ENCOUNTER — LAB ENCOUNTER (OUTPATIENT)
Dept: LAB | Facility: HOSPITAL | Age: 78
End: 2023-06-27
Attending: INTERNAL MEDICINE
Payer: MEDICARE

## 2023-06-27 DIAGNOSIS — I70.0 ATHEROSCLEROSIS OF AORTA (HCC): ICD-10-CM

## 2023-06-27 DIAGNOSIS — Z13.1 SCREENING FOR DIABETES MELLITUS: Primary | ICD-10-CM

## 2023-06-27 LAB
CHOLEST SERPL-MCNC: 138 MG/DL (ref ?–200)
FASTING PATIENT GLUCOSE ANSWER: YES
FASTING PATIENT LIPID ANSWER: YES
GLUCOSE BLD-MCNC: 88 MG/DL (ref 70–99)
HDLC SERPL-MCNC: 60 MG/DL (ref 40–59)
LDLC SERPL CALC-MCNC: 64 MG/DL (ref ?–100)
NONHDLC SERPL-MCNC: 78 MG/DL (ref ?–130)
PSA SERPL-MCNC: 1.25 NG/ML (ref ?–4)
TRIGL SERPL-MCNC: 72 MG/DL (ref 30–149)
VLDLC SERPL CALC-MCNC: 11 MG/DL (ref 0–30)

## 2023-06-27 PROCEDURE — 84153 ASSAY OF PSA TOTAL: CPT

## 2023-06-27 PROCEDURE — 36415 COLL VENOUS BLD VENIPUNCTURE: CPT

## 2023-06-27 PROCEDURE — 80061 LIPID PANEL: CPT

## 2023-06-27 PROCEDURE — 82947 ASSAY GLUCOSE BLOOD QUANT: CPT

## 2023-09-07 ENCOUNTER — OFFICE VISIT (OUTPATIENT)
Dept: HEMATOLOGY/ONCOLOGY | Facility: HOSPITAL | Age: 78
End: 2023-09-07
Attending: INTERNAL MEDICINE
Payer: MEDICARE

## 2023-09-07 VITALS
WEIGHT: 182.19 LBS | OXYGEN SATURATION: 98 % | SYSTOLIC BLOOD PRESSURE: 126 MMHG | BODY MASS INDEX: 27 KG/M2 | TEMPERATURE: 98 F | HEART RATE: 62 BPM | DIASTOLIC BLOOD PRESSURE: 74 MMHG

## 2023-09-07 DIAGNOSIS — D64.9 ANEMIA, UNSPECIFIED TYPE: ICD-10-CM

## 2023-09-07 DIAGNOSIS — D47.3 ESSENTIAL THROMBOCYTOSIS (HCC): Primary | ICD-10-CM

## 2023-09-07 DIAGNOSIS — D50.9 IRON DEFICIENCY ANEMIA, UNSPECIFIED IRON DEFICIENCY ANEMIA TYPE: ICD-10-CM

## 2023-09-07 LAB
ALBUMIN SERPL-MCNC: 3.5 G/DL (ref 3.4–5)
ALBUMIN/GLOB SERPL: 1 {RATIO} (ref 1–2)
ALP LIVER SERPL-CCNC: 107 U/L
ALT SERPL-CCNC: 33 U/L
ANION GAP SERPL CALC-SCNC: 2 MMOL/L (ref 0–18)
AST SERPL-CCNC: 22 U/L (ref 15–37)
BASOPHILS # BLD AUTO: 0.05 X10(3) UL (ref 0–0.2)
BASOPHILS NFR BLD AUTO: 0.9 %
BILIRUB SERPL-MCNC: 0.8 MG/DL (ref 0.1–2)
BUN BLD-MCNC: 14 MG/DL (ref 7–18)
CALCIUM BLD-MCNC: 9.1 MG/DL (ref 8.5–10.1)
CHLORIDE SERPL-SCNC: 107 MMOL/L (ref 98–112)
CO2 SERPL-SCNC: 26 MMOL/L (ref 21–32)
CREAT BLD-MCNC: 0.98 MG/DL
DEPRECATED HBV CORE AB SER IA-ACNC: 135.3 NG/ML
EGFRCR SERPLBLD CKD-EPI 2021: 78 ML/MIN/1.73M2 (ref 60–?)
EOSINOPHIL # BLD AUTO: 0.11 X10(3) UL (ref 0–0.7)
EOSINOPHIL NFR BLD AUTO: 1.9 %
ERYTHROCYTE [DISTWIDTH] IN BLOOD BY AUTOMATED COUNT: 13.2 %
FASTING STATUS PATIENT QL REPORTED: NO
GLOBULIN PLAS-MCNC: 3.4 G/DL (ref 2.8–4.4)
GLUCOSE BLD-MCNC: 124 MG/DL (ref 70–99)
HCT VFR BLD AUTO: 40.6 %
HGB BLD-MCNC: 13.5 G/DL
IMM GRANULOCYTES # BLD AUTO: 0.04 X10(3) UL (ref 0–1)
IMM GRANULOCYTES NFR BLD: 0.7 %
IRON SATN MFR SERPL: 25 %
IRON SERPL-MCNC: 86 UG/DL
LYMPHOCYTES # BLD AUTO: 1.39 X10(3) UL (ref 1–4)
LYMPHOCYTES NFR BLD AUTO: 24.1 %
MCH RBC QN AUTO: 35.6 PG (ref 26–34)
MCHC RBC AUTO-ENTMCNC: 33.3 G/DL (ref 31–37)
MCV RBC AUTO: 107.1 FL
MONOCYTES # BLD AUTO: 0.52 X10(3) UL (ref 0.1–1)
MONOCYTES NFR BLD AUTO: 9 %
NEUTROPHILS # BLD AUTO: 3.65 X10 (3) UL (ref 1.5–7.7)
NEUTROPHILS # BLD AUTO: 3.65 X10(3) UL (ref 1.5–7.7)
NEUTROPHILS NFR BLD AUTO: 63.4 %
OSMOLALITY SERPL CALC.SUM OF ELEC: 282 MOSM/KG (ref 275–295)
PLATELET # BLD AUTO: 453 10(3)UL (ref 150–450)
POTASSIUM SERPL-SCNC: 4.2 MMOL/L (ref 3.5–5.1)
PROT SERPL-MCNC: 6.9 G/DL (ref 6.4–8.2)
RBC # BLD AUTO: 3.79 X10(6)UL
SODIUM SERPL-SCNC: 135 MMOL/L (ref 136–145)
TIBC SERPL-MCNC: 340 UG/DL (ref 240–450)
TRANSFERRIN SERPL-MCNC: 228 MG/DL (ref 200–360)
WBC # BLD AUTO: 5.8 X10(3) UL (ref 4–11)

## 2023-09-07 PROCEDURE — 99214 OFFICE O/P EST MOD 30 MIN: CPT | Performed by: INTERNAL MEDICINE

## 2023-09-07 RX ORDER — VARDENAFIL HYDROCHLORIDE 20 MG/1
20 TABLET ORAL AS NEEDED
COMMUNITY
Start: 2023-07-20

## 2023-09-07 RX ORDER — HYDROXYUREA 500 MG/1
500 CAPSULE ORAL
Qty: 90 CAPSULE | Refills: 11 | Status: SHIPPED | OUTPATIENT
Start: 2023-09-07

## 2023-09-07 NOTE — PROGRESS NOTES
Here with his wife for follow up and establishing care with Dr. Anup Puri. He feels great. No mouth sores or abnormal bleeding. He has always had neuropathy in his toes. Has minor in fingertips. Appetite and energy level are good. No abnormal bleeding with his Eliquis. Taking Hydrea 500mg daily.

## 2023-12-07 ENCOUNTER — TELEPHONE (OUTPATIENT)
Dept: HEMATOLOGY/ONCOLOGY | Facility: HOSPITAL | Age: 78
End: 2023-12-07

## 2023-12-07 ENCOUNTER — NURSE ONLY (OUTPATIENT)
Dept: HEMATOLOGY/ONCOLOGY | Facility: HOSPITAL | Age: 78
End: 2023-12-07
Attending: INTERNAL MEDICINE
Payer: MEDICARE

## 2023-12-07 DIAGNOSIS — D47.3 ESSENTIAL THROMBOCYTOSIS (HCC): ICD-10-CM

## 2023-12-07 DIAGNOSIS — D50.9 IRON DEFICIENCY ANEMIA, UNSPECIFIED IRON DEFICIENCY ANEMIA TYPE: ICD-10-CM

## 2023-12-07 LAB
ALBUMIN SERPL-MCNC: 3.8 G/DL (ref 3.4–5)
ALBUMIN/GLOB SERPL: 1.1 {RATIO} (ref 1–2)
ALP LIVER SERPL-CCNC: 100 U/L
ALT SERPL-CCNC: 25 U/L
ANION GAP SERPL CALC-SCNC: 4 MMOL/L (ref 0–18)
AST SERPL-CCNC: 15 U/L (ref 15–37)
BASOPHILS # BLD AUTO: 0.08 X10(3) UL (ref 0–0.2)
BASOPHILS NFR BLD AUTO: 0.9 %
BILIRUB SERPL-MCNC: 0.7 MG/DL (ref 0.1–2)
BUN BLD-MCNC: 14 MG/DL (ref 9–23)
CALCIUM BLD-MCNC: 9.2 MG/DL (ref 8.5–10.1)
CHLORIDE SERPL-SCNC: 105 MMOL/L (ref 98–112)
CO2 SERPL-SCNC: 27 MMOL/L (ref 21–32)
CREAT BLD-MCNC: 1.02 MG/DL
DEPRECATED HBV CORE AB SER IA-ACNC: 102.5 NG/ML
EGFRCR SERPLBLD CKD-EPI 2021: 74 ML/MIN/1.73M2 (ref 60–?)
EOSINOPHIL # BLD AUTO: 0.11 X10(3) UL (ref 0–0.7)
EOSINOPHIL NFR BLD AUTO: 1.2 %
ERYTHROCYTE [DISTWIDTH] IN BLOOD BY AUTOMATED COUNT: 13.5 %
FASTING STATUS PATIENT QL REPORTED: NO
GLOBULIN PLAS-MCNC: 3.6 G/DL (ref 2.8–4.4)
GLUCOSE BLD-MCNC: 71 MG/DL (ref 70–99)
HCT VFR BLD AUTO: 41.4 %
HGB BLD-MCNC: 13.7 G/DL
IMM GRANULOCYTES # BLD AUTO: 0.17 X10(3) UL (ref 0–1)
IMM GRANULOCYTES NFR BLD: 1.9 %
IRON SATN MFR SERPL: 24 %
IRON SERPL-MCNC: 92 UG/DL
LYMPHOCYTES # BLD AUTO: 1.44 X10(3) UL (ref 1–4)
LYMPHOCYTES NFR BLD AUTO: 16.3 %
MCH RBC QN AUTO: 35.3 PG (ref 26–34)
MCHC RBC AUTO-ENTMCNC: 33.1 G/DL (ref 31–37)
MCV RBC AUTO: 106.7 FL
MONOCYTES # BLD AUTO: 0.88 X10(3) UL (ref 0.1–1)
MONOCYTES NFR BLD AUTO: 9.9 %
NEUTROPHILS # BLD AUTO: 6.17 X10 (3) UL (ref 1.5–7.7)
NEUTROPHILS # BLD AUTO: 6.17 X10(3) UL (ref 1.5–7.7)
NEUTROPHILS NFR BLD AUTO: 69.8 %
OSMOLALITY SERPL CALC.SUM OF ELEC: 281 MOSM/KG (ref 275–295)
PLATELET # BLD AUTO: 588 10(3)UL (ref 150–450)
POTASSIUM SERPL-SCNC: 4.3 MMOL/L (ref 3.5–5.1)
PROT SERPL-MCNC: 7.4 G/DL (ref 6.4–8.2)
RBC # BLD AUTO: 3.88 X10(6)UL
SODIUM SERPL-SCNC: 136 MMOL/L (ref 136–145)
TIBC SERPL-MCNC: 384 UG/DL (ref 240–450)
TRANSFERRIN SERPL-MCNC: 258 MG/DL (ref 200–360)
WBC # BLD AUTO: 8.9 X10(3) UL (ref 4–11)

## 2023-12-07 PROCEDURE — 83550 IRON BINDING TEST: CPT

## 2023-12-07 PROCEDURE — 85025 COMPLETE CBC W/AUTO DIFF WBC: CPT

## 2023-12-07 PROCEDURE — 83540 ASSAY OF IRON: CPT

## 2023-12-07 PROCEDURE — 36415 COLL VENOUS BLD VENIPUNCTURE: CPT

## 2023-12-07 PROCEDURE — 80053 COMPREHEN METABOLIC PANEL: CPT

## 2023-12-07 PROCEDURE — 82728 ASSAY OF FERRITIN: CPT

## 2023-12-07 NOTE — TELEPHONE ENCOUNTER
Called Brett to give him orders from Dr. Lupe Mari to increase his Hydrea to 1000mg daily. He declined, and he said he does not feel comfortable doing that. He says that he will recheck in 2 weeks instead.

## 2023-12-14 ENCOUNTER — TELEPHONE (OUTPATIENT)
Dept: HEMATOLOGY/ONCOLOGY | Facility: HOSPITAL | Age: 78
End: 2023-12-14

## 2023-12-14 NOTE — TELEPHONE ENCOUNTER
Regarding: RE: Non-Urgent Medical Question  ----- Message from Dora Doe sent at 12/14/2023 12:04 PM CST -----       ----- Message sent from Nj Yousif [address] [place], [place] [address]dipti to Carlos Alberto Teddy at 4/27/2020  7:53 AM -----   Hello, the signed physical therapy order is good to use anywhere. It has a protocol on it for his preference for your therapy. When going outside of [facility] we ask that you ensure they send notes to Dr Brad Cerrato for him to review. The fax # is also on the order. If you need anything further please let us know.    ----- Message -----     From: Marilyn Lombardo     Sent: 4/24/2020  2:41 PM CDT       To: Arely Siegel MD  Subject: Non-Urgent Medical Question    Today I spoke with the medical assistant about getting a hard copy of the order for PT. I want to get Physical Therapy from someone in the [address] [place] [address]. The order I picked up is only for a [facility] PT. Please give me a new PT order that will help me and tell me when I can pick it up. Thank You.

## 2023-12-21 ENCOUNTER — NURSE ONLY (OUTPATIENT)
Dept: HEMATOLOGY/ONCOLOGY | Facility: HOSPITAL | Age: 78
End: 2023-12-21
Attending: INTERNAL MEDICINE
Payer: MEDICARE

## 2023-12-21 DIAGNOSIS — D50.9 IRON DEFICIENCY ANEMIA, UNSPECIFIED IRON DEFICIENCY ANEMIA TYPE: ICD-10-CM

## 2023-12-21 DIAGNOSIS — D47.3 ESSENTIAL THROMBOCYTOSIS (HCC): ICD-10-CM

## 2023-12-21 LAB
ALBUMIN SERPL-MCNC: 3.9 G/DL (ref 3.4–5)
ALBUMIN/GLOB SERPL: 1.1 {RATIO} (ref 1–2)
ALP LIVER SERPL-CCNC: 106 U/L
ALT SERPL-CCNC: 27 U/L
ANION GAP SERPL CALC-SCNC: 3 MMOL/L (ref 0–18)
AST SERPL-CCNC: 19 U/L (ref 15–37)
BASOPHILS # BLD AUTO: 0.06 X10(3) UL (ref 0–0.2)
BASOPHILS NFR BLD AUTO: 0.9 %
BILIRUB SERPL-MCNC: 0.8 MG/DL (ref 0.1–2)
BUN BLD-MCNC: 15 MG/DL (ref 9–23)
CALCIUM BLD-MCNC: 9.3 MG/DL (ref 8.5–10.1)
CHLORIDE SERPL-SCNC: 104 MMOL/L (ref 98–112)
CO2 SERPL-SCNC: 28 MMOL/L (ref 21–32)
CREAT BLD-MCNC: 1 MG/DL
DEPRECATED HBV CORE AB SER IA-ACNC: 103 NG/ML
EGFRCR SERPLBLD CKD-EPI 2021: 76 ML/MIN/1.73M2 (ref 60–?)
EOSINOPHIL # BLD AUTO: 0.09 X10(3) UL (ref 0–0.7)
EOSINOPHIL NFR BLD AUTO: 1.3 %
ERYTHROCYTE [DISTWIDTH] IN BLOOD BY AUTOMATED COUNT: 13.1 %
GLOBULIN PLAS-MCNC: 3.5 G/DL (ref 2.8–4.4)
GLUCOSE BLD-MCNC: 104 MG/DL (ref 70–99)
HCT VFR BLD AUTO: 42.7 %
HGB BLD-MCNC: 14 G/DL
IMM GRANULOCYTES # BLD AUTO: 0.07 X10(3) UL (ref 0–1)
IMM GRANULOCYTES NFR BLD: 1 %
IRON SATN MFR SERPL: 30 %
IRON SERPL-MCNC: 114 UG/DL
LYMPHOCYTES # BLD AUTO: 1 X10(3) UL (ref 1–4)
LYMPHOCYTES NFR BLD AUTO: 14.8 %
MCH RBC QN AUTO: 36 PG (ref 26–34)
MCHC RBC AUTO-ENTMCNC: 32.8 G/DL (ref 31–37)
MCV RBC AUTO: 109.8 FL
MONOCYTES # BLD AUTO: 0.57 X10(3) UL (ref 0.1–1)
MONOCYTES NFR BLD AUTO: 8.4 %
NEUTROPHILS # BLD AUTO: 4.96 X10 (3) UL (ref 1.5–7.7)
NEUTROPHILS # BLD AUTO: 4.96 X10(3) UL (ref 1.5–7.7)
NEUTROPHILS NFR BLD AUTO: 73.6 %
OSMOLALITY SERPL CALC.SUM OF ELEC: 281 MOSM/KG (ref 275–295)
PLATELET # BLD AUTO: 461 10(3)UL (ref 150–450)
POTASSIUM SERPL-SCNC: 4.1 MMOL/L (ref 3.5–5.1)
PROT SERPL-MCNC: 7.4 G/DL (ref 6.4–8.2)
RBC # BLD AUTO: 3.89 X10(6)UL
SODIUM SERPL-SCNC: 135 MMOL/L (ref 136–145)
TIBC SERPL-MCNC: 375 UG/DL (ref 240–450)
TRANSFERRIN SERPL-MCNC: 252 MG/DL (ref 200–360)
WBC # BLD AUTO: 6.8 X10(3) UL (ref 4–11)

## 2023-12-21 PROCEDURE — 85025 COMPLETE CBC W/AUTO DIFF WBC: CPT

## 2023-12-21 PROCEDURE — 83550 IRON BINDING TEST: CPT

## 2023-12-21 PROCEDURE — 83540 ASSAY OF IRON: CPT

## 2023-12-21 PROCEDURE — 82728 ASSAY OF FERRITIN: CPT

## 2023-12-21 PROCEDURE — 80053 COMPREHEN METABOLIC PANEL: CPT

## 2023-12-21 PROCEDURE — 36415 COLL VENOUS BLD VENIPUNCTURE: CPT

## 2024-02-28 ENCOUNTER — NURSE ONLY (OUTPATIENT)
Dept: HEMATOLOGY/ONCOLOGY | Facility: HOSPITAL | Age: 79
End: 2024-02-28

## 2024-03-07 ENCOUNTER — NURSE ONLY (OUTPATIENT)
Dept: HEMATOLOGY/ONCOLOGY | Facility: HOSPITAL | Age: 79
End: 2024-03-07
Attending: INTERNAL MEDICINE
Payer: MEDICARE

## 2024-03-07 DIAGNOSIS — D47.3 ESSENTIAL THROMBOCYTOSIS (HCC): ICD-10-CM

## 2024-03-07 DIAGNOSIS — D50.9 IRON DEFICIENCY ANEMIA, UNSPECIFIED IRON DEFICIENCY ANEMIA TYPE: ICD-10-CM

## 2024-03-07 LAB
ALBUMIN SERPL-MCNC: 3.8 G/DL (ref 3.4–5)
ALBUMIN/GLOB SERPL: 1.3 {RATIO} (ref 1–2)
ALP LIVER SERPL-CCNC: 95 U/L
ALT SERPL-CCNC: 23 U/L
ANION GAP SERPL CALC-SCNC: 2 MMOL/L (ref 0–18)
AST SERPL-CCNC: 15 U/L (ref 15–37)
BASOPHILS # BLD AUTO: 0.06 X10(3) UL (ref 0–0.2)
BASOPHILS NFR BLD AUTO: 1 %
BILIRUB SERPL-MCNC: 0.6 MG/DL (ref 0.1–2)
BUN BLD-MCNC: 10 MG/DL (ref 9–23)
CALCIUM BLD-MCNC: 9.6 MG/DL (ref 8.5–10.1)
CHLORIDE SERPL-SCNC: 107 MMOL/L (ref 98–112)
CO2 SERPL-SCNC: 27 MMOL/L (ref 21–32)
CREAT BLD-MCNC: 1 MG/DL
DEPRECATED HBV CORE AB SER IA-ACNC: 78.7 NG/ML
EGFRCR SERPLBLD CKD-EPI 2021: 76 ML/MIN/1.73M2 (ref 60–?)
EOSINOPHIL # BLD AUTO: 0.09 X10(3) UL (ref 0–0.7)
EOSINOPHIL NFR BLD AUTO: 1.6 %
ERYTHROCYTE [DISTWIDTH] IN BLOOD BY AUTOMATED COUNT: 13.5 %
GLOBULIN PLAS-MCNC: 2.9 G/DL (ref 2.8–4.4)
GLUCOSE BLD-MCNC: 89 MG/DL (ref 70–99)
HCT VFR BLD AUTO: 38.6 %
HGB BLD-MCNC: 13.3 G/DL
IMM GRANULOCYTES # BLD AUTO: 0.05 X10(3) UL (ref 0–1)
IMM GRANULOCYTES NFR BLD: 0.9 %
IRON SATN MFR SERPL: 19 %
IRON SERPL-MCNC: 68 UG/DL
LYMPHOCYTES # BLD AUTO: 1.18 X10(3) UL (ref 1–4)
LYMPHOCYTES NFR BLD AUTO: 20.5 %
MCH RBC QN AUTO: 36.8 PG (ref 26–34)
MCHC RBC AUTO-ENTMCNC: 34.5 G/DL (ref 31–37)
MCV RBC AUTO: 106.9 FL
MONOCYTES # BLD AUTO: 0.65 X10(3) UL (ref 0.1–1)
MONOCYTES NFR BLD AUTO: 11.3 %
NEUTROPHILS # BLD AUTO: 3.74 X10 (3) UL (ref 1.5–7.7)
NEUTROPHILS # BLD AUTO: 3.74 X10(3) UL (ref 1.5–7.7)
NEUTROPHILS NFR BLD AUTO: 64.7 %
OSMOLALITY SERPL CALC.SUM OF ELEC: 281 MOSM/KG (ref 275–295)
PLATELET # BLD AUTO: 417 10(3)UL (ref 150–450)
POTASSIUM SERPL-SCNC: 4.4 MMOL/L (ref 3.5–5.1)
PROT SERPL-MCNC: 6.7 G/DL (ref 6.4–8.2)
RBC # BLD AUTO: 3.61 X10(6)UL
SODIUM SERPL-SCNC: 136 MMOL/L (ref 136–145)
TIBC SERPL-MCNC: 350 UG/DL (ref 240–450)
TRANSFERRIN SERPL-MCNC: 235 MG/DL (ref 200–360)
WBC # BLD AUTO: 5.8 X10(3) UL (ref 4–11)

## 2024-03-07 PROCEDURE — 83540 ASSAY OF IRON: CPT

## 2024-03-07 PROCEDURE — 83550 IRON BINDING TEST: CPT

## 2024-03-07 PROCEDURE — 36415 COLL VENOUS BLD VENIPUNCTURE: CPT

## 2024-03-07 PROCEDURE — 82728 ASSAY OF FERRITIN: CPT

## 2024-03-07 PROCEDURE — 80053 COMPREHEN METABOLIC PANEL: CPT

## 2024-03-07 PROCEDURE — 85025 COMPLETE CBC W/AUTO DIFF WBC: CPT

## 2024-03-08 ENCOUNTER — OFFICE VISIT (OUTPATIENT)
Dept: HEMATOLOGY/ONCOLOGY | Facility: HOSPITAL | Age: 79
End: 2024-03-08
Attending: INTERNAL MEDICINE
Payer: MEDICARE

## 2024-03-08 VITALS
SYSTOLIC BLOOD PRESSURE: 127 MMHG | TEMPERATURE: 97 F | WEIGHT: 180.63 LBS | HEIGHT: 69.02 IN | BODY MASS INDEX: 26.75 KG/M2 | OXYGEN SATURATION: 97 % | RESPIRATION RATE: 16 BRPM | DIASTOLIC BLOOD PRESSURE: 72 MMHG | HEART RATE: 66 BPM

## 2024-03-08 DIAGNOSIS — D47.3 ESSENTIAL THROMBOCYTOSIS (HCC): Primary | ICD-10-CM

## 2024-03-08 DIAGNOSIS — I48.0 PAROXYSMAL ATRIAL FIBRILLATION (HCC): ICD-10-CM

## 2024-03-08 PROCEDURE — G2211 COMPLEX E/M VISIT ADD ON: HCPCS | Performed by: INTERNAL MEDICINE

## 2024-03-08 PROCEDURE — 99215 OFFICE O/P EST HI 40 MIN: CPT | Performed by: INTERNAL MEDICINE

## 2024-03-08 RX ORDER — HYDROXYUREA 500 MG/1
500 CAPSULE ORAL
Qty: 90 CAPSULE | Refills: 3 | Status: SHIPPED | OUTPATIENT
Start: 2024-03-08

## 2024-03-08 NOTE — PROGRESS NOTES
Hematology/Oncology Clinic Follow Up Visit    Patient Name: Myles Hernandez  Medical Record Number: [id number]    YOB: 1945   PCP: Melissa Griffin MD    Reason for Consultation:  Myles Hernandez was seen today for the diagnosis of JAK2+ essential thrombocythemia    Hematologic History:  12/2013: JAK2 V617F mutation positive; started on hydrea 500mg daily    History of Present Illness:      80 y/o M PMH long-standing JAK2 V617-positive essential thrombocythemia who presents for follow up.    - here with his wife, Alta  - feeling well, stable energy levels  - just got back from the [place], he walked 200 miles in 2 months with his wife  - no issues with the hydrea  - taking apixaban for afib  - no further issues with hemorrhoidal bleeding    Past Medical History:  Past Medical History:   Diagnosis Date    Arrhythmia     Atrial fibrillation (HCC) 03/12/2015    Fib/Flutter with ablation    Duodenal ulcer 6/13    Elevated coronary artery calcium score 3/12/2015    Essential hypertension     Hashimoto's thyroiditis     HEMORRHOIDS     Hyperlipidemia     HYPOTHYROIDISM     KIDNEY STONE     Osteoarthritis 3/12/2015    PUD (peptic ulcer disease) 11/25/2013    Tubular [id number]. Hoscheit 11/25/2013     Past Surgical History:   Procedure Laterality Date    ABLATION      cardiac    ANGIOPLASTY (CORONARY)  2015    CATARACT      bilateral-- Dr. Lori Kuo    COLONOSCOPY,BIOPSY  7/07    3 mm. sigmoid hyperplastic polyp     COLONOSCOPY,BIOPSY  3/01    adenomatous polyp    COLONOSCOPY,DIAGNOSTIC  1994    adenomatous polyp    COLONOSCOPY,DIAGNOSTIC  1997    wnl    COLONOSCOPY,DIAGNOSTIC  8/9/10    wnl, hemorrhoids    COLONOSCOPY,DIAGNOSTIC  06/29/2013    diverticulosis, 2 small transverse colon adenomatous polyps    COLONOSCOPY,DIAGNOSTIC  09/10/2018    divertiuclosis, ascending/2 hepatic flexure/sigmoid polyps    COLONOSCOPY,JOSE MCCLURE,SNARE  7/06    1.5 cm. flat ascending colonic adenoma    EGD N/A 9/10/2018     Procedure: ESOPHAGOGASTRODUODENOSCOPY, COLONOSCOPY, POSSIBLE BIOPSY, POSSIBLE POLYPECTOMY 85127, 58378;  Surgeon: Romain Nagel MD;  [place] [facility] [place]    HEMORRHOIDECTOMY  12/2017    rubber banding    HERNIA SURGERY      umbilical    KNEE REPLACEMENT SURGERY Right     OTHER SURGICAL HISTORY      knee arthroscopy    OTHER SURGICAL HISTORY      cardiac ablation    OTHER SURGICAL HISTORY  220736    right thumb surgery    REPAIR ROTATOR CUFF,ACUTE Right 7/20/16    UPPER GI ENDOSCOPY,BIOPSY  3/01    wnl, SB Bx wnl    UPPER GI ENDOSCOPY,DIAGNOSIS  06/29/2013    antral gastric and duodenal ulcerations, gastric bx benign and SB Bx negative, showed peptic duodenitis    UPPER GI ENDOSCOPY,DIAGNOSIS  09/10/2018    Normal, gastric bx taken       Home Medications:  No outpatient medications have been marked as taking for the 3/8/24 encounter (Office Visit) with Solis Melendez MD.       Allergies:   Allergies   Allergen Reactions    Bees Tightness in Throat     Bee Venom.       Psychosocial History:  Social History     Socioeconomic History    Marital status:      Spouse name: Not on file    Number of children: Not on file    Years of education: Not on file    Highest education level: Not on file   Occupational History    Not on file   Tobacco Use    Smoking status: Former     Packs/day: 1.00     Years: 20.00     Additional pack years: 0.00     Total pack years: 20.00     Types: Cigarettes    Smokeless tobacco: Former     Quit date: 1/1/1974   Vaping Use    Vaping Use: Never used   Substance and Sexual Activity    Alcohol use: Yes     Alcohol/week: 0.0 standard drinks of alcohol     Comment: social    Drug use: No    Sexual activity: Never   Other Topics Concern     Service Not Asked    Blood Transfusions Not Asked    Caffeine Concern Not Asked    Occupational Exposure Not Asked    Hobby Hazards Not Asked    Sleep Concern Not Asked    Stress Concern Not Asked    Weight Concern Not Asked     Special Diet Not Asked    Back Care Not Asked    Exercise Not Asked    Bike Helmet Not Asked    Seat Belt Yes    Self-Exams Not Asked   Social History Narrative    Not on file     Social Determinants of Health     Financial Resource Strain: Not on file   Food Insecurity: Not on file   Transportation Needs: Not on file   Physical Activity: Not on file   Stress: Not on file   Social Connections: Not on file   Housing Stability: Not on file       Family Medical History:  Family History   Problem Relation Age of Onset    Other (Other) Father         infection    Heart Disorder Mother     Cancer Mother         liver tumor    Cancer Maternal Uncle         stomach cancer       Review of Systems:  A 10-point ROS was done with pertinent positives and negative per the HPI    Vital Signs:  Height: 175.3 cm (5' 9.02\") (03/08 0952)  Weight: 81.9 kg (180 lb 9.6 oz) (03/08 0952)  BSA (Calculated - sq m): 1.98 sq meters (03/08 0952)  Pulse: 66 (03/08 0952)  BP: 127/72 (03/08 0952)  Temp: 96.9 °F (36.1 °C) (03/08 0952)  Do Not Use - Resp Rate: --  SpO2: 97 % (03/08 0952)    Wt Readings from Last 6 Encounters:   03/08/24 81.9 kg (180 lb 9.6 oz)   09/07/23 82.6 kg (182 lb 3.2 oz)   03/03/23 80.4 kg (177 lb 3.2 oz)   12/16/22 80.6 kg (177 lb 9.6 oz)   12/14/21 82.1 kg (181 lb)   11/12/21 81.2 kg (179 lb)       Physical Examination:  General: Patient is alert and oriented, not in acute distress  Psych: Mood and affect are appropriate  Eyes: EOMI, PERRL  ENT: Oropharynx is clear, no adenopathy  CV: no LE edema  Respiratory: Non-labored respirations  GI/Abd: Soft, non-tender   Neurological: Grossly intact   Skin: no rashes or petechiae    Laboratory:  Lab Results   Component Value Date    WBC 5.8 03/07/2024    WBC 6.8 12/21/2023    WBC 8.9 12/07/2023    HGB 13.3 03/07/2024    HGB 14.0 12/21/2023    HGB 13.7 12/07/2023    HCT 38.6 (L) 03/07/2024    .9 (H) 03/07/2024    MCH 36.8 (H) 03/07/2024    MCHC 34.5 03/07/2024    RDW 13.5  03/07/2024    .0 03/07/2024    .0 (H) 12/21/2023    .0 (H) 12/07/2023     Lab Results   Component Value Date    GLU 89 03/07/2024    BUN 10 03/07/2024    BUNCREA 15.1 07/02/2021    CREATSERUM 1.00 03/07/2024    CREATSERUM 1.00 12/21/2023    CREATSERUM 1.02 12/07/2023    ANIONGAP 2 03/07/2024    GFR 84 07/10/2017    GFRNAA 79 06/13/2022    GFRAA 91 06/13/2022    CA 9.6 03/07/2024    OSMOCALC 281 03/07/2024    ALKPHO 95 03/07/2024    AST 15 03/07/2024    ALT 23 03/07/2024    BILT 0.6 03/07/2024    TP 6.7 03/07/2024    ALB 3.8 03/07/2024    GLOBULIN 2.9 03/07/2024    AGRATIO 1.1 (L) 06/19/2013     03/07/2024    K 4.4 03/07/2024     03/07/2024    CO2 27.0 03/07/2024     No results found for: \"PTT\", \"PT\", \"INR\"      Impression & Plan:     Essential thrombocythemia  - JAK2 V617F mutation positive  - platelet count at goal <450k on 500mg hydrea daily. Prior thrombocytosis may have been due to inflammation from hemorrhoidal bleeding which is no longer an issue  - no need for aspirin as patient already on apixaban for his afib  - continue labs q3 monthly, follow up in 6months    Atrial fibrillation  - continue apixaban    Iron deficiency anemia  - iron studies without evidence of iron deficiency s/p IV infed    Follow up: 6 months    Solis Melendez  Hematology/Medical Oncology  [facility]

## 2024-03-08 NOTE — PROGRESS NOTES
Education Record    Learner:  Patient and Spouse    Disease / Diagnosis: JAK2+ Essential Thrombocythemia    Barriers / Limitations:  None   Comments:    Method:  Discussion   Comments:    General Topics:  Medication, Side effects and symptom management, and Plan of care reviewed   Comments:    Outcome:  Shows understanding   Comments:    Taking 500mg Hydrea daily. Feels really well. When he gets a good night of sleep, he has a lot of energy. Sometimes his energy is interrupted and he has a bad night. He and his wife Alta returned from [place] recently- they walked 200 miles in 2 months.

## 2024-05-10 ENCOUNTER — LAB ENCOUNTER (OUTPATIENT)
Dept: LAB | Facility: HOSPITAL | Age: 79
End: 2024-05-10
Attending: OTOLARYNGOLOGY

## 2024-05-10 DIAGNOSIS — E07.9 DISEASE OF THYROID GLAND: Primary | ICD-10-CM

## 2024-05-10 LAB
T3FREE SERPL-MCNC: 1.75 PG/ML (ref 2.4–4.2)
T4 FREE SERPL-MCNC: 1.3 NG/DL (ref 0.8–1.7)
TSI SER-ACNC: 1.1 MIU/ML (ref 0.36–3.74)

## 2024-05-10 PROCEDURE — 84443 ASSAY THYROID STIM HORMONE: CPT

## 2024-05-10 PROCEDURE — 36415 COLL VENOUS BLD VENIPUNCTURE: CPT

## 2024-05-10 PROCEDURE — 84439 ASSAY OF FREE THYROXINE: CPT

## 2024-05-10 PROCEDURE — 84481 FREE ASSAY (FT-3): CPT

## 2024-06-11 ENCOUNTER — LAB ENCOUNTER (OUTPATIENT)
Dept: LAB | Facility: HOSPITAL | Age: 79
End: 2024-06-11
Attending: INTERNAL MEDICINE
Payer: MEDICARE

## 2024-06-11 DIAGNOSIS — D50.9 IRON DEFICIENCY ANEMIA, UNSPECIFIED IRON DEFICIENCY ANEMIA TYPE: ICD-10-CM

## 2024-06-11 DIAGNOSIS — D47.3 ESSENTIAL THROMBOCYTOSIS (HCC): ICD-10-CM

## 2024-06-11 LAB
ALBUMIN SERPL-MCNC: 3.7 G/DL (ref 3.4–5)
ALBUMIN/GLOB SERPL: 1.1 {RATIO} (ref 1–2)
ALP LIVER SERPL-CCNC: 103 U/L
ALT SERPL-CCNC: 23 U/L
ANION GAP SERPL CALC-SCNC: 6 MMOL/L (ref 0–18)
AST SERPL-CCNC: 23 U/L (ref 15–37)
BASOPHILS # BLD AUTO: 0.05 X10(3) UL (ref 0–0.2)
BASOPHILS NFR BLD AUTO: 0.7 %
BILIRUB SERPL-MCNC: 0.7 MG/DL (ref 0.1–2)
BUN BLD-MCNC: 12 MG/DL (ref 9–23)
CALCIUM BLD-MCNC: 9 MG/DL (ref 8.5–10.1)
CHLORIDE SERPL-SCNC: 106 MMOL/L (ref 98–112)
CO2 SERPL-SCNC: 25 MMOL/L (ref 21–32)
CREAT BLD-MCNC: 0.9 MG/DL
DEPRECATED HBV CORE AB SER IA-ACNC: 55.4 NG/ML
EGFRCR SERPLBLD CKD-EPI 2021: 86 ML/MIN/1.73M2 (ref 60–?)
EOSINOPHIL # BLD AUTO: 0.08 X10(3) UL (ref 0–0.7)
EOSINOPHIL NFR BLD AUTO: 1.1 %
ERYTHROCYTE [DISTWIDTH] IN BLOOD BY AUTOMATED COUNT: 13.3 %
FASTING STATUS PATIENT QL REPORTED: NO
GLOBULIN PLAS-MCNC: 3.5 G/DL (ref 2.8–4.4)
GLUCOSE BLD-MCNC: 107 MG/DL (ref 70–99)
HCT VFR BLD AUTO: 38.6 %
HGB BLD-MCNC: 13 G/DL
IMM GRANULOCYTES # BLD AUTO: 0.05 X10(3) UL (ref 0–1)
IMM GRANULOCYTES NFR BLD: 0.7 %
IRON SATN MFR SERPL: 15 %
IRON SERPL-MCNC: 58 UG/DL
LYMPHOCYTES # BLD AUTO: 1.19 X10(3) UL (ref 1–4)
LYMPHOCYTES NFR BLD AUTO: 17 %
MCH RBC QN AUTO: 36.5 PG (ref 26–34)
MCHC RBC AUTO-ENTMCNC: 33.7 G/DL (ref 31–37)
MCV RBC AUTO: 108.4 FL
MONOCYTES # BLD AUTO: 0.74 X10(3) UL (ref 0.1–1)
MONOCYTES NFR BLD AUTO: 10.6 %
NEUTROPHILS # BLD AUTO: 4.87 X10 (3) UL (ref 1.5–7.7)
NEUTROPHILS # BLD AUTO: 4.87 X10(3) UL (ref 1.5–7.7)
NEUTROPHILS NFR BLD AUTO: 69.9 %
OSMOLALITY SERPL CALC.SUM OF ELEC: 284 MOSM/KG (ref 275–295)
PLATELET # BLD AUTO: 449 10(3)UL (ref 150–450)
POTASSIUM SERPL-SCNC: 4.6 MMOL/L (ref 3.5–5.1)
PROT SERPL-MCNC: 7.2 G/DL (ref 6.4–8.2)
RBC # BLD AUTO: 3.56 X10(6)UL
SODIUM SERPL-SCNC: 137 MMOL/L (ref 136–145)
TIBC SERPL-MCNC: 380 UG/DL (ref 240–450)
TRANSFERRIN SERPL-MCNC: 255 MG/DL (ref 200–360)
WBC # BLD AUTO: 7 X10(3) UL (ref 4–11)

## 2024-06-11 PROCEDURE — 85025 COMPLETE CBC W/AUTO DIFF WBC: CPT

## 2024-06-11 PROCEDURE — 80053 COMPREHEN METABOLIC PANEL: CPT

## 2024-06-11 PROCEDURE — 83540 ASSAY OF IRON: CPT

## 2024-06-11 PROCEDURE — 83550 IRON BINDING TEST: CPT

## 2024-06-11 PROCEDURE — 36415 COLL VENOUS BLD VENIPUNCTURE: CPT

## 2024-06-11 PROCEDURE — 82728 ASSAY OF FERRITIN: CPT

## 2024-06-12 ENCOUNTER — TELEPHONE (OUTPATIENT)
Dept: HEMATOLOGY/ONCOLOGY | Facility: HOSPITAL | Age: 79
End: 2024-06-12

## 2024-06-12 NOTE — TELEPHONE ENCOUNTER
----- Message from Jennifer Gerber sent at 6/12/2024  8:10 AM CDT -----  Please notify pt: Platelets remain at goal so no change in hydrea dose. He is now iron deficient and needs another dose of Infed (has had previously, will not need test dose). We will recheck labs in 3 months. Orders for IV iron are in but I didn't send a message to billing or scheduling.

## 2024-06-18 ENCOUNTER — OFFICE VISIT (OUTPATIENT)
Dept: HEMATOLOGY/ONCOLOGY | Facility: HOSPITAL | Age: 79
End: 2024-06-18
Attending: INTERNAL MEDICINE

## 2024-06-18 VITALS
SYSTOLIC BLOOD PRESSURE: 117 MMHG | HEART RATE: 56 BPM | OXYGEN SATURATION: 96 % | RESPIRATION RATE: 16 BRPM | DIASTOLIC BLOOD PRESSURE: 76 MMHG | TEMPERATURE: 98 F

## 2024-06-18 DIAGNOSIS — D50.0 IRON DEFICIENCY ANEMIA DUE TO CHRONIC BLOOD LOSS: Primary | ICD-10-CM

## 2024-06-18 PROCEDURE — 96365 THER/PROPH/DIAG IV INF INIT: CPT

## 2024-06-18 NOTE — PROGRESS NOTES
Education Record    Learner:  Patient    Disease / Diagnosis: iron deficiency anemia     Barriers / Limitations:  None   Comments:    Method:  Discussion   Comments:    General Topics:  Plan of care reviewed   Comments:    Outcome:  Shows understanding   Comments:    Patient tolerated infed infusion without issue. Pt aware of MD f/u appointment in September and left treatment center in stable condition.

## 2024-06-23 ENCOUNTER — LAB ENCOUNTER (OUTPATIENT)
Dept: LAB | Facility: HOSPITAL | Age: 79
End: 2024-06-23
Attending: INTERNAL MEDICINE

## 2024-06-23 DIAGNOSIS — Z00.00 ROUTINE GENERAL MEDICAL EXAMINATION AT A HEALTH CARE FACILITY: Primary | ICD-10-CM

## 2024-06-23 DIAGNOSIS — I70.0 ATHEROSCLEROSIS OF AORTA (HCC): ICD-10-CM

## 2024-06-23 DIAGNOSIS — Z12.5 SPECIAL SCREENING FOR MALIGNANT NEOPLASM OF PROSTATE: ICD-10-CM

## 2024-06-23 LAB
CHOLEST SERPL-MCNC: 136 MG/DL (ref ?–200)
FASTING PATIENT GLUCOSE ANSWER: YES
FASTING PATIENT LIPID ANSWER: YES
GLUCOSE BLD-MCNC: 99 MG/DL (ref 70–99)
HDLC SERPL-MCNC: 60 MG/DL (ref 40–59)
LDLC SERPL CALC-MCNC: 60 MG/DL (ref ?–100)
NONHDLC SERPL-MCNC: 76 MG/DL (ref ?–130)
PSA SERPL-MCNC: 1.71 NG/ML (ref ?–4)
TRIGL SERPL-MCNC: 81 MG/DL (ref 30–149)
VLDLC SERPL CALC-MCNC: 12 MG/DL (ref 0–30)

## 2024-06-23 PROCEDURE — 36415 COLL VENOUS BLD VENIPUNCTURE: CPT

## 2024-06-23 PROCEDURE — 80061 LIPID PANEL: CPT

## 2024-06-23 PROCEDURE — 84153 ASSAY OF PSA TOTAL: CPT

## 2024-06-23 PROCEDURE — 82947 ASSAY GLUCOSE BLOOD QUANT: CPT

## 2024-08-13 ENCOUNTER — APPOINTMENT (OUTPATIENT)
Dept: GENERAL RADIOLOGY | Facility: HOSPITAL | Age: 79
End: 2024-08-13
Attending: EMERGENCY MEDICINE
Payer: MEDICARE

## 2024-08-13 ENCOUNTER — HOSPITAL ENCOUNTER (INPATIENT)
Facility: HOSPITAL | Age: 79
LOS: 1 days | Discharge: HOME | End: 2024-08-14
Attending: EMERGENCY MEDICINE | Admitting: HOSPITALIST
Payer: MEDICARE

## 2024-08-13 DIAGNOSIS — J12.82 PNEUMONIA DUE TO 2019 NOVEL CORONAVIRUS: ICD-10-CM

## 2024-08-13 DIAGNOSIS — U07.1 PNEUMONIA DUE TO 2019 NOVEL CORONAVIRUS: ICD-10-CM

## 2024-08-13 DIAGNOSIS — U07.1 COVID: Primary | ICD-10-CM

## 2024-08-13 LAB
ALBUMIN SERPL-MCNC: 4.4 G/DL (ref 3.2–4.8)
ALBUMIN/GLOB SERPL: 1.6 {RATIO} (ref 1–2)
ALP LIVER SERPL-CCNC: 86 U/L
ALT SERPL-CCNC: 20 U/L
ANION GAP SERPL CALC-SCNC: 7 MMOL/L (ref 0–18)
AST SERPL-CCNC: 24 U/L (ref ?–34)
BASOPHILS # BLD AUTO: 0.02 X10(3) UL (ref 0–0.2)
BASOPHILS NFR BLD AUTO: 0.2 %
BILIRUB SERPL-MCNC: 1 MG/DL (ref 0.2–1.1)
BUN BLD-MCNC: 13 MG/DL (ref 9–23)
CALCIUM BLD-MCNC: 9.7 MG/DL (ref 8.7–10.4)
CHLORIDE SERPL-SCNC: 97 MMOL/L (ref 98–112)
CO2 SERPL-SCNC: 24 MMOL/L (ref 21–32)
CREAT BLD-MCNC: 0.91 MG/DL
EGFRCR SERPLBLD CKD-EPI 2021: 85 ML/MIN/1.73M2 (ref 60–?)
EOSINOPHIL # BLD AUTO: 0 X10(3) UL (ref 0–0.7)
EOSINOPHIL NFR BLD AUTO: 0 %
ERYTHROCYTE [DISTWIDTH] IN BLOOD BY AUTOMATED COUNT: 13.4 %
GLOBULIN PLAS-MCNC: 2.8 G/DL (ref 2–3.5)
GLUCOSE BLD-MCNC: 126 MG/DL (ref 70–99)
HCT VFR BLD AUTO: 37.9 %
HGB BLD-MCNC: 13.2 G/DL
IMM GRANULOCYTES # BLD AUTO: 0.06 X10(3) UL (ref 0–1)
IMM GRANULOCYTES NFR BLD: 0.7 %
LACTATE SERPL-SCNC: 0.8 MMOL/L (ref 0.5–2)
LYMPHOCYTES # BLD AUTO: 0.36 X10(3) UL (ref 1–4)
LYMPHOCYTES NFR BLD AUTO: 4.1 %
MCH RBC QN AUTO: 36.4 PG (ref 26–34)
MCHC RBC AUTO-ENTMCNC: 34.8 G/DL (ref 31–37)
MCV RBC AUTO: 104.4 FL
MONOCYTES # BLD AUTO: 0.87 X10(3) UL (ref 0.1–1)
MONOCYTES NFR BLD AUTO: 9.8 %
NEUTROPHILS # BLD AUTO: 7.57 X10 (3) UL (ref 1.5–7.7)
NEUTROPHILS # BLD AUTO: 7.57 X10(3) UL (ref 1.5–7.7)
NEUTROPHILS NFR BLD AUTO: 85.2 %
OSMOLALITY SERPL CALC.SUM OF ELEC: 268 MOSM/KG (ref 275–295)
PLATELET # BLD AUTO: 354 10(3)UL (ref 150–450)
POTASSIUM SERPL-SCNC: 3.9 MMOL/L (ref 3.5–5.1)
PROT SERPL-MCNC: 7.2 G/DL (ref 5.7–8.2)
RBC # BLD AUTO: 3.63 X10(6)UL
SARS-COV-2 RNA RESP QL NAA+PROBE: DETECTED
SODIUM SERPL-SCNC: 128 MMOL/L (ref 136–145)
WBC # BLD AUTO: 8.9 X10(3) UL (ref 4–11)

## 2024-08-13 PROCEDURE — 93005 ELECTROCARDIOGRAM TRACING: CPT

## 2024-08-13 PROCEDURE — 36415 COLL VENOUS BLD VENIPUNCTURE: CPT

## 2024-08-13 PROCEDURE — 87040 BLOOD CULTURE FOR BACTERIA: CPT | Performed by: EMERGENCY MEDICINE

## 2024-08-13 PROCEDURE — 96368 THER/DIAG CONCURRENT INF: CPT

## 2024-08-13 PROCEDURE — 71045 X-RAY EXAM CHEST 1 VIEW: CPT | Performed by: EMERGENCY MEDICINE

## 2024-08-13 PROCEDURE — 93010 ELECTROCARDIOGRAM REPORT: CPT

## 2024-08-13 PROCEDURE — 83605 ASSAY OF LACTIC ACID: CPT | Performed by: EMERGENCY MEDICINE

## 2024-08-13 PROCEDURE — 80053 COMPREHEN METABOLIC PANEL: CPT | Performed by: EMERGENCY MEDICINE

## 2024-08-13 PROCEDURE — 85025 COMPLETE CBC W/AUTO DIFF WBC: CPT | Performed by: EMERGENCY MEDICINE

## 2024-08-13 PROCEDURE — 99285 EMERGENCY DEPT VISIT HI MDM: CPT

## 2024-08-14 ENCOUNTER — APPOINTMENT (OUTPATIENT)
Dept: CT IMAGING | Facility: HOSPITAL | Age: 79
End: 2024-08-14
Attending: EMERGENCY MEDICINE
Payer: MEDICARE

## 2024-08-14 VITALS
SYSTOLIC BLOOD PRESSURE: 97 MMHG | DIASTOLIC BLOOD PRESSURE: 56 MMHG | RESPIRATION RATE: 25 BRPM | OXYGEN SATURATION: 94 % | HEIGHT: 69 IN | WEIGHT: 185.38 LBS | HEART RATE: 67 BPM | TEMPERATURE: 98 F | BODY MASS INDEX: 27.46 KG/M2

## 2024-08-14 PROBLEM — J12.82 PNEUMONIA DUE TO 2019 NOVEL CORONAVIRUS: Status: ACTIVE | Noted: 2024-08-14

## 2024-08-14 PROBLEM — U07.1 PNEUMONIA DUE TO 2019 NOVEL CORONAVIRUS: Status: ACTIVE | Noted: 2024-08-14

## 2024-08-14 PROBLEM — U07.1 COVID: Status: ACTIVE | Noted: 2024-08-14

## 2024-08-14 LAB
ANION GAP SERPL CALC-SCNC: 6 MMOL/L (ref 0–18)
ATRIAL RATE: 91 BPM
BASOPHILS # BLD AUTO: 0.03 X10(3) UL (ref 0–0.2)
BASOPHILS NFR BLD AUTO: 0.2 %
BUN BLD-MCNC: 10 MG/DL (ref 9–23)
CALCIUM BLD-MCNC: 9.7 MG/DL (ref 8.7–10.4)
CHLORIDE SERPL-SCNC: 98 MMOL/L (ref 98–112)
CO2 SERPL-SCNC: 24 MMOL/L (ref 21–32)
CREAT BLD-MCNC: 0.85 MG/DL
EGFRCR SERPLBLD CKD-EPI 2021: 87 ML/MIN/1.73M2 (ref 60–?)
EOSINOPHIL # BLD AUTO: 0 X10(3) UL (ref 0–0.7)
EOSINOPHIL NFR BLD AUTO: 0 %
ERYTHROCYTE [DISTWIDTH] IN BLOOD BY AUTOMATED COUNT: 13.4 %
GLUCOSE BLD-MCNC: 103 MG/DL (ref 70–99)
HCT VFR BLD AUTO: 37.5 %
HGB BLD-MCNC: 12.7 G/DL
IMM GRANULOCYTES # BLD AUTO: 0.11 X10(3) UL (ref 0–1)
IMM GRANULOCYTES NFR BLD: 0.9 %
LYMPHOCYTES # BLD AUTO: 0.73 X10(3) UL (ref 1–4)
LYMPHOCYTES NFR BLD AUTO: 6 %
MAGNESIUM SERPL-MCNC: 1.8 MG/DL (ref 1.6–2.6)
MCH RBC QN AUTO: 36.1 PG (ref 26–34)
MCHC RBC AUTO-ENTMCNC: 33.9 G/DL (ref 31–37)
MCV RBC AUTO: 106.5 FL
MONOCYTES # BLD AUTO: 1.21 X10(3) UL (ref 0.1–1)
MONOCYTES NFR BLD AUTO: 9.9 %
NEUTROPHILS # BLD AUTO: 10.16 X10 (3) UL (ref 1.5–7.7)
NEUTROPHILS # BLD AUTO: 10.16 X10(3) UL (ref 1.5–7.7)
NEUTROPHILS NFR BLD AUTO: 83 %
OSMOLALITY SERPL CALC.SUM OF ELEC: 265 MOSM/KG (ref 275–295)
P AXIS: 78 DEGREES
P-R INTERVAL: 268 MS
PLATELET # BLD AUTO: 343 10(3)UL (ref 150–450)
POTASSIUM SERPL-SCNC: 3.8 MMOL/L (ref 3.5–5.1)
Q-T INTERVAL: 364 MS
QRS DURATION: 98 MS
QTC CALCULATION (BEZET): 447 MS
R AXIS: 8 DEGREES
RBC # BLD AUTO: 3.52 X10(6)UL
SODIUM SERPL-SCNC: 128 MMOL/L (ref 136–145)
T AXIS: 94 DEGREES
VENTRICULAR RATE: 91 BPM
WBC # BLD AUTO: 12.2 X10(3) UL (ref 4–11)

## 2024-08-14 PROCEDURE — 80048 BASIC METABOLIC PNL TOTAL CA: CPT | Performed by: INTERNAL MEDICINE

## 2024-08-14 PROCEDURE — 96365 THER/PROPH/DIAG IV INF INIT: CPT

## 2024-08-14 PROCEDURE — 85025 COMPLETE CBC W/AUTO DIFF WBC: CPT | Performed by: INTERNAL MEDICINE

## 2024-08-14 PROCEDURE — 71275 CT ANGIOGRAPHY CHEST: CPT | Performed by: EMERGENCY MEDICINE

## 2024-08-14 PROCEDURE — 83735 ASSAY OF MAGNESIUM: CPT | Performed by: INTERNAL MEDICINE

## 2024-08-14 RX ORDER — FLECAINIDE ACETATE 50 MG/1
50 TABLET ORAL 2 TIMES DAILY
Status: DISCONTINUED | OUTPATIENT
Start: 2024-08-14 | End: 2024-08-14

## 2024-08-14 RX ORDER — LEVOTHYROXINE SODIUM 88 UG/1
88 TABLET ORAL
Status: DISCONTINUED | OUTPATIENT
Start: 2024-08-14 | End: 2024-08-14

## 2024-08-14 RX ORDER — ATORVASTATIN CALCIUM 40 MG/1
40 TABLET, FILM COATED ORAL DAILY
Status: DISCONTINUED | OUTPATIENT
Start: 2024-08-14 | End: 2024-08-14

## 2024-08-14 RX ORDER — BENZONATATE 200 MG/1
200 CAPSULE ORAL 3 TIMES DAILY PRN
Status: DISCONTINUED | OUTPATIENT
Start: 2024-08-14 | End: 2024-08-14

## 2024-08-14 RX ORDER — SODIUM PHOSPHATE, DIBASIC AND SODIUM PHOSPHATE, MONOBASIC 7; 19 G/230ML; G/230ML
1 ENEMA RECTAL ONCE AS NEEDED
Status: DISCONTINUED | OUTPATIENT
Start: 2024-08-14 | End: 2024-08-14

## 2024-08-14 RX ORDER — GUAIFENESIN 600 MG/1
600 TABLET, EXTENDED RELEASE ORAL 2 TIMES DAILY
Status: DISCONTINUED | OUTPATIENT
Start: 2024-08-14 | End: 2024-08-14

## 2024-08-14 RX ORDER — HYDROXYUREA 500 MG/1
500 CAPSULE ORAL
Status: DISCONTINUED | OUTPATIENT
Start: 2024-08-14 | End: 2024-08-14

## 2024-08-14 RX ORDER — SODIUM CHLORIDE 9 MG/ML
INJECTION, SOLUTION INTRAVENOUS CONTINUOUS
Status: DISCONTINUED | OUTPATIENT
Start: 2024-08-14 | End: 2024-08-14

## 2024-08-14 RX ORDER — BISACODYL 10 MG
10 SUPPOSITORY, RECTAL RECTAL
Status: DISCONTINUED | OUTPATIENT
Start: 2024-08-14 | End: 2024-08-14

## 2024-08-14 RX ORDER — METOPROLOL SUCCINATE 25 MG/1
25 TABLET, EXTENDED RELEASE ORAL
Status: DISCONTINUED | OUTPATIENT
Start: 2024-08-14 | End: 2024-08-14

## 2024-08-14 RX ORDER — PANTOPRAZOLE SODIUM 40 MG/1
40 TABLET, DELAYED RELEASE ORAL
Status: DISCONTINUED | OUTPATIENT
Start: 2024-08-14 | End: 2024-08-14

## 2024-08-14 RX ORDER — POLYETHYLENE GLYCOL 3350 17 G/17G
17 POWDER, FOR SOLUTION ORAL DAILY PRN
Status: DISCONTINUED | OUTPATIENT
Start: 2024-08-14 | End: 2024-08-14

## 2024-08-14 RX ORDER — MELATONIN
800 DAILY
Status: DISCONTINUED | OUTPATIENT
Start: 2024-08-14 | End: 2024-08-14

## 2024-08-14 RX ORDER — METOCLOPRAMIDE HYDROCHLORIDE 5 MG/ML
10 INJECTION INTRAMUSCULAR; INTRAVENOUS EVERY 8 HOURS PRN
Status: DISCONTINUED | OUTPATIENT
Start: 2024-08-14 | End: 2024-08-14

## 2024-08-14 RX ORDER — MELATONIN
3 NIGHTLY PRN
Status: DISCONTINUED | OUTPATIENT
Start: 2024-08-14 | End: 2024-08-14

## 2024-08-14 RX ORDER — SENNOSIDES 8.6 MG
17.2 TABLET ORAL NIGHTLY PRN
Status: DISCONTINUED | OUTPATIENT
Start: 2024-08-14 | End: 2024-08-14

## 2024-08-14 RX ORDER — MAGNESIUM OXIDE 400 MG/1
400 TABLET ORAL ONCE
Status: COMPLETED | OUTPATIENT
Start: 2024-08-14 | End: 2024-08-14

## 2024-08-14 RX ORDER — ONDANSETRON 2 MG/ML
4 INJECTION INTRAMUSCULAR; INTRAVENOUS EVERY 6 HOURS PRN
Status: DISCONTINUED | OUTPATIENT
Start: 2024-08-14 | End: 2024-08-14

## 2024-08-14 RX ORDER — ACETAMINOPHEN 500 MG
500 TABLET ORAL EVERY 4 HOURS PRN
Status: DISCONTINUED | OUTPATIENT
Start: 2024-08-14 | End: 2024-08-14

## 2024-08-14 NOTE — ED QUICK NOTES
Orders for admission, patient is aware of plan and ready to go upstairs. Any questions, please call ED RN joanna at extension [phone].     Patient Covid vaccination status: Fully vaccinated     COVID Test Ordered in ED: Rapid SARS-CoV-2 by PCR    COVID Suspicion at Admission: covid +    Running Infusions:  None    Mental Status/LOC at time of transport: a/o x4    Other pertinent information:   CIWA score: N/A   NIH score:  N/A

## 2024-08-14 NOTE — H&P
[facility] Hospitalist H&P       CC:   Chief Complaint   Patient presents with    Fever    Covid        PCP: Melissa Griffin MD    History of Present Illness:    Patient is a 79-year-old male significant past medical history of atrial fibrillation, nonobstructive CAD, hypertension, hypothyroidism, essential thrombocytosis presents with subjective fevers, productive cough that started on 8/12/2024 while he was still in [place], tested positive on 8/13/2024 for COVID, patient patient had a subjective fever of 103 at home, also had some confusion, currently patient's biggest complaint is productive cough, no shortness of breath no nausea no vomiting no diarrhea no fevers currently however did have a fever last night of 101.1, patient states that he would like to go home, wife at bedside  In the emergency room patient did have fever of one 101.1, rest vitals were stable he was saturating 95% on room air      Labs were remarkable for a sodium of 128, CBC showed platelets of 343 hemoglobin of 12.7 and a white count of 12.2 COVID was positive and CT a chest was done that was negative for PE but did show bronchitis with mild reticulonodular groundglass opacities 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 Hoscheit        [facility]  Past Surgical History:   Procedure Laterality Date    Ablation      cardiac    Angioplasty (coronary)  2015    Cataract      bilateral-- Dr. Lori Kuo    Colonoscopy,biopsy  7/07    3 mm.  sigmoid hyperplastic polyp     Colonoscopy,biopsy  3/01    adenomatous polyp    Colonoscopy,diagnostic  1994    adenomatous polyp    Colonoscopy,diagnostic  1997    wnl    Colonoscopy,diagnostic  8/9/10    wnl, hemorrhoids    Colonoscopy,diagnostic  06/29/2013    diverticulosis, 2 small transverse colon adenomatous polyps    Colonoscopy,diagnostic  09/10/2018    divertiuclosis, ascending/2 hepatic flexure/sigmoid polyps    Colonoscopy,remv lesn,snare  7/06    1.5 cm. flat ascending colonic adenoma    Egd N/A 9/10/2018    Procedure: ESOPHAGOGASTRODUODENOSCOPY, COLONOSCOPY, POSSIBLE BIOPSY, POSSIBLE POLYPECTOMY 83041, 76558;  Surgeon: Romain Nagel MD;  [facility] [facility]    Hemorrhoidectomy  12/2017    rubber banding    Hernia surgery      umbilical    Knee replacement surgery Right     Other surgical history      knee arthroscopy    Other surgical history      cardiac ablation    Other surgical history  [id number]    right thumb surgery    Repair rotator cuff,acute Right 7/20/16    Upper gi endoscopy,biopsy  3/01    wnl, SB Bx wnl    Upper gi endoscopy,diagnosis  06/29/2013    antral gastric and duodenal ulcerations, gastric bx benign and SB Bx negative, showed peptic duodenitis    Upper gi endoscopy,diagnosis  09/10/2018    Normal, gastric bx taken        ALL:  Allergies   Allergen Reactions    Bees Tightness in Throat     Bee Venom.        Home Medications:  Outpatient Medications Marked as Taking for the 8/13/24 encounter (Hospital Encounter)   Medication Sig Dispense Refill    hydroxyurea 500 MG Oral Cap Take 1 capsule (500 mg total) by mouth once daily. 90 capsule 3    levothyroxine 88 MCG Oral Tab Take 1 tablet (88 mcg total) by mouth before breakfast. 90 tablet 1    flecainide 50 MG Oral Tab Take 1 tablet (50 mg total) by mouth 2 (two) times daily. 180 tablet 3    apixaban (ELIQUIS) 5 MG Oral Tab Take 1 tablet (5 mg total) by mouth 2 (two) times daily. 180 tablet 3    atorvastatin 40 MG Oral  Tab Take 1 tablet (40 mg total) by mouth daily. 90 tablet 3    omeprazole 20 MG Oral Capsule Delayed Release Take 1 capsule (20 mg total) by mouth daily. 90 capsule 3    Acidophilus/Pectin Oral Cap Take 1 capsule by mouth daily.      Vitamin D3 25 MCG (1000 UT) Oral Tab Take 1 tablet (1,000 Units total) by mouth daily.      Calcium 500-125 MG-UNIT Oral Tab Take 1 tablet by mouth daily.      folic acid 400 MCG Oral Tab Take 2 tablets (800 mcg total) by mouth daily.      triamcinolone acetonide 0.1 % External Cream Apply to irritated, red rash daily after bathing prn 453.6 g 1         Soc Hx  Social History     Tobacco Use    Smoking status: Former     Current packs/day: 1.00     Average packs/day: 1 pack/day for 20.0 years (20.0 ttl pk-yrs)     Types: Cigarettes    Smokeless tobacco: Former     Quit date: 1/1/1974   Substance Use Topics    Alcohol use: Yes     Alcohol/week: 0.0 standard drinks of alcohol     Comment: social        Fam Hx  Family History   Problem Relation Age of Onset    Other (Other) Father         infection    Heart Disorder Mother     Cancer Mother         liver tumor    Cancer Maternal Uncle         stomach cancer       Review of Systems  General: Denies unintentional weight loss, fevers, or chills-negative except for above  HEENT: Denies vision loss or double vision, denies hearing loss  Cardiovascular: Denies chest pain, palpitations, peripheral edema  Pulmonary: Denies cough, shortness of breath, or wheezing  Gastrointestinal: Denies abdominal pain, melena, or hematochezia  Genitourinary: Denies urinary frequency, urgency, and dysuria  Neurologic: Denies numbness, headaches, focal weakness  Skin: Denies rashes, sores  Endocrine: Denies heat or cold intolerance, denies polydipsia  Hematologic: Denies abnormal bleeding or bruising     OBJECTIVE:  BP 97/56 (BP Location: Left arm)   Pulse 67   Temp 98 °F (36.7 °C) (Oral)   Resp 25   Ht 5' 9\" (1.753 m)   Wt 185 lb 6.4 oz (84.1 kg)   SpO2 94%    BMI 27.38 kg/m²     BP Readings from Last 3 Encounters:   08/14/24 97/56   06/18/24 117/76   03/08/24 127/72     Wt Readings from Last 3 Encounters:   08/14/24 185 lb 6.4 oz (84.1 kg)   03/08/24 180 lb 9.6 oz (81.9 kg)   09/07/23 182 lb 3.2 oz (82.6 kg)       Wt Readings from Last 6 Encounters:   08/14/24 185 lb 6.4 oz (84.1 kg)   03/08/24 180 lb 9.6 oz (81.9 kg)   09/07/23 182 lb 3.2 oz (82.6 kg)   03/03/23 177 lb 3.2 oz (80.4 kg)   12/16/22 177 lb 9.6 oz (80.6 kg)   12/14/21 181 lb (82.1 kg)     Gen: No acute distress, alert and oriented x 3  Pulm: Lungs clear bilaterally, good inspiratory effort -  CV:  nL S1S2  Abd: soft, NT/ND, no hepatomegaly, +BS  MSK: moving all extremities, no edema  Neuro: no focal deficits  Skin: no rashes/lesions  Psych: normal mood/affect          Diagnostic Data:    CBC/Chem  Recent Labs   Lab 08/13/24 2312 08/14/24  0819   WBC 8.9 12.2*   HGB 13.2 12.7*   .4* 106.5*   .0 343.0       Recent Labs   Lab 08/13/24 2312 08/14/24  0819   * 128*   K 3.9 3.8   CL 97* 98   CO2 24.0 24.0   BUN 13 10   CREATSERUM 0.91 0.85   * 103*   CA 9.7 9.7   MG  --  1.8       Recent Labs   Lab 08/13/24  2312   ALT 20   AST 24   ALB 4.4       No results for input(s): \"TROP\" in the last 168 hours.        Radiology: CTA CHEST (CPT=71275)    Result Date: 8/14/2024  PROCEDURE:  CT ANGIOGRAPHY, CHEST (CPT=71275)  COMPARISON:  [facility], CT CALCIUM SCORING (CPT=75571), 3/10/2014, 12:42 PM.  INDICATIONS:  Fever/Covid  TECHNIQUE:  IV contrast-enhanced multislice CT angiography is performed through the pulmonary arterial anatomy. 3D volume renderings are generated.  Dose reduction techniques were used. Dose information is transmitted to the ACR (American College of Radiology) NRDR (National Radiology Data Registry) which includes the Dose Index Registry.  PATIENT STATED HISTORY:(As transcribed by Technologist)  Cough and fever. +covid   CONTRAST USED:  100cc of Isovue 370  FINDINGS:   VASCULATURE:  No visible pulmonary arterial thrombus or attenuation.  THORACIC AORTA:  Mild calcified atherosclerosis without aneurysm or dissection. LUNGS:  Mild thickening of central bronchi primarily involving the right and left lower lobes.  Suspected partial atelectasis of the basal segments of the left lower lobe.  Areas of subpleural reticular scarring noted within both lungs.  No pulmonary edema.  Subtle reticular nodular and ground-glass changes noted within the basal segments of the lower lobes.  Additional patchy ground-glass opacities of the left upper lobe and superior segment left lower lobe.  MEDIASTINUM:  No adenopathy or mass.  GERI:  No mass or adenopathy.  CARDIAC:  Heart size within normal limits.  No pericardial effusion.  Mild LAD coronary calcification. PLEURA:  No mass or effusion.  CHEST WALL:  No mass or axillary adenopathy.  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 BONES:  No bony lesion or fracture.  OTHER:  Negative.             CONCLUSION:   1. Negative for pulmonary embolism.  2. Mild thickening of central bronchi, primarily involving the right and left lower lobes indicating a component of bronchitis or reactive airway disease/asthma.  Additional mild reticular nodular and ground-glass changes within both lungs indicating low-grade inflammatory process.  3. 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    LOCATION:  [facility]   Dictated by (CST): Chanda Rosales MD on 8/14/2024 at 6:28 AM     Finalized by (CST): Chanda Rosales MD on 8/14/2024 at 6:34 AM       XR CHEST AP PORTABLE  (CPT=71045)    Result Date: 8/14/2024  PROCEDURE:  XR CHEST AP PORTABLE  (CPT=71045)  TECHNIQUE:  AP chest radiograph was obtained.  COMPARISON:  None.  INDICATIONS:  Fever/Covid  PATIENT STATED HISTORY: (As transcribed by  Technologist)  Patient offered no additional history at this time.              CONCLUSION:  Normal heart size and pulmonary vascularity.  Slight atelectasis/infiltrate in the left lung base.   LOCATION:  [facility]      Dictated by (CST): Maureen Lam MD on 8/14/2024 at 0:06 AM     Finalized by (CST): Maureen Lam MD on 8/14/2024 at 0:07 AM              ASSESSMENT / PLAN:       Patient is a 79-year-old male significant past medical history of atrial fibrillation, nonobstructive CAD, hypertension, hypothyroidism, essential thrombocytosis presents with subjective fevers, productive cough t    # COVID-19 viral infection  # Fevers  -Has been afebrile, CT reviewed, no evidence of any pneumonia at this time  -Patient does not qualify for remdesivir or steroids as he is not hypoxic  -Symptom management, likely can discharge with outpatient lagevrio  -Patient already had his prescription can resume it on discharge    # Hyponatremia  -Mild currently at 128  -Check BMP as an outpatient      # Chronic celiac dissection  -Patient is currently asymptomatic,  -CT reviewed, will need to follow-up with vascular surgery as an outpatient for yearly ultrasound to monitor this small chronic celiac dissection#        # A-fib  -Follows up with Dr. Lazo, continue flecainide, Eliquis    # ET  -Follows up with hematology oncology, continue Hydrea as well as Eliquis at this time  -Platelet platelets are at goal      # Hypothyroidism  #      Prophy:  DVT: SCDs already on Eliquis  Deconditioning prevention: PT OT    [facility] likely okay to discharge home    Outpatient records reviewed confirming patient's medical history and medications.     Further recommendations pending patient's clinical course.  [facility] hospitalist to continue to follow patient while in house    Time spent: greater than 95 minutes spent in d/w pt/family, coordination of care, and d/w staff.     Debi siegel MD  Internal Medicine  [facility]  Hospitalist  Pager: [phone]      **Certification      PHYSICIAN Certification of Need for Inpatient Hospitalization - Initial Certification    Patient will require inpatient services that will reasonably be expected to span two midnight's based on the clinical documentation in H+P.   Based on patients current state of illness, I anticipate that, after discharge, patient will require TBD.

## 2024-08-14 NOTE — ED PROVIDER NOTES
Patient Seen in: [facility] Emergency Department      History     Chief Complaint   Patient presents with    Fever    Covid     Stated Complaint: Fever/Covid    Subjective:   HPI    79-year-old male presenting with fever.  Patient states history is obtained per patient but also per family patient had a cough congestion since Sunday.  The patient denies any chest pain abdominal pain black or bloody bowel movements family did note that the patient was little with fevers tonight as well as confused prompting the visit here.    Objective:   Past Medical History:    Arrhythmia    Atrial fibrillation (HCC)    Fib/Flutter with ablation    Duodenal ulcer    Elevated coronary artery calcium score    Essential hypertension    Hashimoto's thyroiditis    HEMORRHOIDS    Hyperlipidemia    HYPOTHYROIDISM    KIDNEY STONE    Osteoarthritis    PUD (peptic ulcer disease)    Tubular [id number]. Hoscheit              Past Surgical History:   Procedure Laterality Date    Ablation      cardiac    Angioplasty (coronary)  2015    Cataract      bilateral-- Dr. Lori Kuo    Colonoscopy,biopsy  7/07    3 mm. sigmoid hyperplastic polyp     Colonoscopy,biopsy  3/01    adenomatous polyp    Colonoscopy,diagnostic  1994    adenomatous polyp    Colonoscopy,diagnostic  1997    wnl    Colonoscopy,diagnostic  8/9/10    wnl, hemorrhoids    Colonoscopy,diagnostic  06/29/2013    diverticulosis, 2 small transverse colon adenomatous polyps    Colonoscopy,diagnostic  09/10/2018    divertiuclosis, ascending/2 hepatic flexure/sigmoid polyps    Colonoscopy,remv lesn,snare  7/06    1.5 cm. flat ascending colonic adenoma    Egd N/A 9/10/2018    Procedure: ESOPHAGOGASTRODUODENOSCOPY, COLONOSCOPY, POSSIBLE BIOPSY, POSSIBLE POLYPECTOMY 78182, 47462;  Surgeon: Romain Nagel MD;  Location: [facility]    Hemorrhoidectomy  12/2017    rubber banding    Hernia surgery      umbilical    Knee replacement surgery Right     Other surgical  history      knee arthroscopy    Other surgical history      cardiac ablation    Other surgical history  [id number]    right thumb surgery    Repair rotator cuff,acute Right 7/20/16    Upper gi endoscopy,biopsy  3/01    wnl, SB Bx wnl    Upper gi endoscopy,diagnosis  06/29/2013    antral gastric and duodenal ulcerations, gastric bx benign and SB Bx negative, showed peptic duodenitis    Upper gi endoscopy,diagnosis  09/10/2018    Normal, gastric bx taken                Social History     Socioeconomic History    Marital status:    Tobacco Use    Smoking status: Former     Current packs/day: 1.00     Average packs/day: 1 pack/day for 20.0 years (20.0 ttl pk-yrs)     Types: Cigarettes    Smokeless tobacco: Former     Quit date: 1/1/1974   Vaping Use    Vaping status: Never Used   Substance and Sexual Activity    Alcohol use: Yes     Alcohol/week: 0.0 standard drinks of alcohol     Comment: social    Drug use: No    Sexual activity: Never   Other Topics Concern    Seat Belt Yes              Review of Systems    Positive for stated Chief Complaint: Fever and Covid    Other systems are as noted in HPI.  Constitutional and vital signs reviewed.      All other systems reviewed and negative except as noted above.    Physical Exam     ED Triage Vitals [08/13/24 2301]   /74   Pulse 90   Resp 22   Temp (!) 101.1 °F (38.4 °C)   Temp src Oral   SpO2 95 %   O2 Device None (Room air)       Current Vitals:   Vital Signs  BP: 115/74  Pulse: 90  Resp: 22  Temp: (!) 101.1 °F (38.4 °C)  Temp src: Oral    Oxygen Therapy  SpO2: 95 %  O2 Device: None (Room air)            Physical Exam  Awake alert patient appears no distress HEENT exam is normal lungs are clear cardiovascular exam regular rhythm abdomen soft nontender extremities no COVID cyanosis or edema no rash back exam is normal skin is nondiaphoretic       ED Course     Labs Reviewed   COMP METABOLIC PANEL (14)   CBC WITH DIFFERENTIAL WITH PLATELET   URINALYSIS WITH CULTURE  REFLEX   LACTIC ACID, PLASMA   BLOOD CULTURE   BLOOD CULTURE   RAPID SARS-COV-2 BY PCR     EKG    Rate, intervals and axes as noted on EKG Report.  Rate: 91  Rhythm: Sinus Rhythm  Reading: Some slight slurring of the ST segments anterolaterally in a downward position                 Differential diagnosis includes sepsis, COVID         MDM                                         Medical Decision Making  79-year-old male presenting for fever IV established cardiac monitor shows a sinus rhythm pulse ox shows no signs of hypoxia COVID test positive CBC shows stable hemoglobin level metabolic panel stable renal function troponin shows no elevation negative NSTEMI CT independent interpretation by ED physician shows bronchitis chest x-ray shows no focal infiltrate.  Patient was ordered antibiotics and will be admitted at this time    Problems Addressed:  COVID: acute illness or injury  Pneumonia due to 2019 novel coronavirus: acute illness or injury    Amount and/or Complexity of Data Reviewed  Labs: ordered. Decision-making details documented in ED Course.  Radiology: ordered and independent interpretation performed. Decision-making details documented in ED Course.  ECG/medicine tests: ordered and independent interpretation performed. Decision-making details documented in ED Course.        Disposition and Plan     Clinical Impression:  No diagnosis found.     Disposition:  There is no disposition on file for this visit.  There is no disposition time on file for this visit.    Follow-up:  No follow-up provider specified.        Medications Prescribed:  Current Discharge Medication List

## 2024-08-14 NOTE — ED INITIAL ASSESSMENT (HPI)
Pt developed cough while in [place]. Tested positive Sunday for Covid. Wife called EMS because pt was not making sense. Pt had temp of 103 at home.

## 2024-08-14 NOTE — PROGRESS NOTES
NURSING ADMISSION NOTE      Patient admitted via Cart  Oriented to room 529  Safety precautions initiated.  Bed in low position.    Admission navigator complete.   Alert x4. Room air. Congested cough noted. NSR on tele. Voids. Up SBA. Updated on POC, no questions or concerns at this time. Verbalized understanding.     Call light in reach.

## 2024-08-14 NOTE — ED QUICK NOTES
Pt developed cough while in [place]. Tested positive Sunday for Covid. Wife called EMS because pt was not making sense. Pt had temp of 103 at home. Upon arrival pt is a/o x4. No c/o pain. Congested cough noted. Pt is febrile and sweating had 1000mg of tylenol 1hr prior to arrival (2200).

## 2024-08-14 NOTE — PLAN OF CARE
Received patient on room air, saturating well, VSS. Medications administered per the MAR and patient needs addressed. Patient is A & O x4, continent and independent in the room. Family at bedside updated on plan of care.

## 2024-08-15 NOTE — PAYOR COMM NOTE
--------------  ADMISSION REVIEW     Payor: [employer]  Subscriber #:  [id number]  Authorization Number: [id number]    Admit date: 8/14/24  Admit time:  3:19 AM     8/13 Patient Seen in: [facility] Emergency Department    History   Stated Complaint: Fever/Covid    79-year-old male presenting with fever.  Patient states history is obtained per patient but also per family patient had a cough congestion since Sunday.  The patient denies any chest pain abdominal pain black or bloody bowel movements family did note that the patient was little with fevers tonight as well as confused prompting the visit here.    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 Hoscheit     Past Surgical History:   Procedure Laterality Date    Ablation      cardiac    Angioplasty (coronary)  2015    Cataract      bilateral-- Dr. Lori Kuo    Colonoscopy,biopsy  7/07    3 mm. sigmoid hyperplastic polyp     Colonoscopy,biopsy  3/01    adenomatous polyp    Colonoscopy,diagnostic  1994    adenomatous polyp    Colonoscopy,diagnostic  1997    wnl    Colonoscopy,diagnostic  8/9/10    wnl, hemorrhoids    Colonoscopy,diagnostic  06/29/2013    diverticulosis, 2 small transverse colon adenomatous polyps    Colonoscopy,diagnostic  09/10/2018    divertiuclosis, ascending/2 hepatic flexure/sigmoid polyps    Colonoscopy,remv lesn,snare  7/06    1.5 cm. flat ascending colonic adenoma    Egd N/A 9/10/2018    Procedure: ESOPHAGOGASTRODUODENOSCOPY, COLONOSCOPY, POSSIBLE BIOPSY, POSSIBLE POLYPECTOMY 33606, 27609;  Surgeon: Romain Nagel MD;  Location: [facility]    Hemorrhoidectomy  12/2017    rubber banding     Hernia surgery      umbilical    Knee replacement surgery Right     Other surgical history      knee arthroscopy    Other surgical history      cardiac ablation    Other surgical history  [id number]    right thumb surgery    Repair rotator cuff,acute Right 7/20/16    Upper gi endoscopy,biopsy  3/01    wnl, SB Bx wnl    Upper gi endoscopy,diagnosis  06/29/2013    antral gastric and duodenal ulcerations, gastric bx benign and SB Bx negative, showed peptic duodenitis    Upper gi endoscopy,diagnosis  09/10/2018    Normal, gastric bx taken     Physical Exam     ED Triage Vitals [08/13/24 2301]   /74   Pulse 90   Resp 22   Temp (!) 101.1 °F (38.4 °C)   Temp src Oral   SpO2 95 %   O2 Device None (Room air)     Current Vitals:   Vital Signs  BP: 115/74  Pulse: 90  Resp: 22  Temp: (!) 101.1 °F (38.4 °C)  Temp src: Oral    Physical Exam  Awake alert patient appears no distress HEENT exam is normal lungs are clear cardiovascular exam regular rhythm abdomen soft nontender extremities no COVID cyanosis or edema no rash back exam is normal skin is nondiaphoretic     Labs Reviewed   COMP METABOLIC PANEL (14)   CBC WITH DIFFERENTIAL WITH PLATELET   URINALYSIS WITH CULTURE REFLEX   LACTIC ACID, PLASMA   BLOOD CULTURE   BLOOD CULTURE   RAPID SARS-COV-2 BY PCR     EKG 91 Sinus Rhythm  Reading: Some slight slurring of the ST segments anterolaterally in a downward position    Medical Decision Making  79-year-old male presenting for fever IV established cardiac monitor shows a sinus rhythm pulse ox shows no signs of hypoxia COVID test positive CBC shows stable hemoglobin level metabolic panel stable renal function troponin shows no elevation negative NSTEMI CT independent interpretation by ED physician shows bronchitis chest x-ray shows no focal infiltrate.  Patient was ordered antibiotics and will be admitted at this time    Problems Addressed:  COVID: acute illness or injury  Pneumonia due to 2019 novel coronavirus: acute illness or  injury      8/14:    History and Physical      Patient is a 79-year-old male significant past medical history of atrial fibrillation, nonobstructive CAD, hypertension, hypothyroidism, essential thrombocytosis presents with subjective fevers, productive cough that started on 8/12/2024 while he was still in [place], tested positive on 8/13/2024 for COVID, patient patient had a subjective fever of 103 at home, also had some confusion, currently patient's biggest complaint is productive cough, no shortness of breath no nausea no vomiting no diarrhea no fevers currently however did have a fever last night of 101.1, patient states that he would like to go home, wife at bedside  In the emergency room patient did have fever of one 101.1, rest vitals were stable he was saturating 95% on room air      Labs were remarkable for a sodium of 128, CBC showed platelets of 343 hemoglobin of 12.7 and a white count of 12.2 COVID was positive and CT a chest was done that was negative for PE but did show bronchitis with mild reticulonodular groundglass opacities 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 with associated vessel dilatation     Patient was given a dose of Rocephin azithromycin and admitted  Prior to presentation patient was also on L AG EVAR I/O      OBJECTIVE:  BP 97/56 (BP Location: Left arm)   Pulse 67   Temp 98 °F (36.7 °C) (Oral)   Resp 25   Ht 5' 9\" (1.753 m)   Wt 185 lb 6.4 oz (84.1 kg)   SpO2 94%   BMI 27.38 kg/m²       Gen: No acute distress, alert and oriented x 3  Pulm: Lungs clear bilaterally, good inspiratory effort -  CV:  nL S1S2  Abd: soft, NT/ND, no hepatomegaly, +BS  MSK: moving all extremities, no edema  Neuro: no focal deficits  Skin: no rashes/lesions  Psych: normal mood/affect      Lab 08/13/24  2312 08/14/24  0819   WBC 8.9 12.2*   HGB 13.2 12.7*   .4* 106.5*   .0 343.0      * 128*   K 3.9 3.8   CL 97* 98   CO2 24.0 24.0   BUN 13 10   CREATSERUM 0.91 0.85   GLU  126* 103*   CA 9.7 9.7   MG  --  1.8       CTA:  1. Negative for pulmonary embolism. 2. Mild thickening of central bronchi, primarily involving the right and left lower lobes indicating a component of bronchitis or reactive airway disease/asthma. Additional mild reticular nodular and ground-glass changes within both lungs indicating low-grade inflammatory process. 3. 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       ASSESSMENT / PLAN:         Patient is a 79-year-old male significant past medical history of atrial fibrillation, nonobstructive CAD, hypertension, hypothyroidism, essential thrombocytosis presents with subjective fevers, productive cough t     # COVID-19 viral infection  # Fevers  -Has been afebrile, CT reviewed, no evidence of any pneumonia at this time  -Patient does not qualify for remdesivir or steroids as he is not hypoxic  -Symptom management, likely can discharge with outpatient lagevrio  -Patient already had his prescription can resume it on discharge     # Hyponatremia  -Mild currently at 128  -Check BMP as an outpatient        # Chronic celiac dissection  -Patient is currently asymptomatic,  -CT reviewed, will need to follow-up with vascular surgery as an outpatient for yearly ultrasound to monitor this small chronic celiac dissection#           # A-fib  -Follows up with Dr. Lazo, continue flecainide, Eliquis     # ET  -Follows up with hematology oncology, continue Hydrea as well as Eliquis at this time  -Platelet platelets are at goal        # Hypothyroidism  #        Prophy:  DVT: SCDs already on Eliquis  Deconditioning prevention: PT OT     Dispo: admit to [facility] with telemetry likely okay to discharge home        DC HOME      MEDICATIONS ADMINISTERED IN LAST 1 DAY:  magnesium oxide (Mag-Ox) tab 400 mg       Date Action Dose Route User    Discharged on 8/14/2024 8/14/2024 1210 Given 400 mg Oral Peggy Bustillo RN             Vitals (last day) before discharge       Date/Time Temp Pulse Resp BP SpO2 Weight O2 Device O2 Flow Rate (L/min) [facility]    08/14/24 1149 98 °F (36.7 °C) 67 25 97/56 94 % -- None (Room air) 0 L/min     08/14/24 0745 -- -- -- -- -- 185 lb 6.4 oz (84.1 kg) -- --     08/14/24 0743 98.1 °F (36.7 °C) 67 20 100/58 96 % -- None (Room air) 0 L/min     08/14/24 0300 98.4 °F (36.9 °C) 72 18 102/62 93 % 185 lb 6.4 oz (84.1 kg) None (Room air) -- DS    08/14/24 0130 -- 79 20 96/63 94 % -- None (Room air) -- TC    08/14/24 0045 99.5 °F (37.5 °C) -- -- -- -- -- -- --     08/14/24 0030 -- 85 23 107/65 95 % -- None (Room air) --     08/13/24 2333 -- -- -- -- -- -- None (Room air) --     08/13/24 2330 -- 88 24 115/68 94 % -- None (Room air) --     08/13/24 2301 101.1 °F (38.4 °C) 90 22 115/74 95 % 174 lb (78.9 kg) None (Room air) --     08/13/24 2300 -- 90 31 115/74 94 % -- None (Room air) --

## 2024-09-06 ENCOUNTER — APPOINTMENT (OUTPATIENT)
Dept: HEMATOLOGY/ONCOLOGY | Facility: HOSPITAL | Age: 79
End: 2024-09-06
Attending: INTERNAL MEDICINE
Payer: MEDICARE

## 2024-09-06 DIAGNOSIS — D50.9 IRON DEFICIENCY ANEMIA, UNSPECIFIED IRON DEFICIENCY ANEMIA TYPE: ICD-10-CM

## 2024-09-06 DIAGNOSIS — D47.3 ESSENTIAL THROMBOCYTOSIS (HCC): ICD-10-CM

## 2024-09-06 LAB
ALBUMIN SERPL-MCNC: 4.4 G/DL (ref 3.2–4.8)
ALBUMIN/GLOB SERPL: 1.6 {RATIO} (ref 1–2)
ALP LIVER SERPL-CCNC: 97 U/L
ALT SERPL-CCNC: 24 U/L
ANION GAP SERPL CALC-SCNC: 3 MMOL/L (ref 0–18)
AST SERPL-CCNC: 23 U/L (ref ?–34)
BASOPHILS # BLD AUTO: 0.05 X10(3) UL (ref 0–0.2)
BASOPHILS NFR BLD AUTO: 0.9 %
BILIRUB SERPL-MCNC: 0.8 MG/DL (ref 0.2–1.1)
BUN BLD-MCNC: 13 MG/DL (ref 9–23)
CALCIUM BLD-MCNC: 10.3 MG/DL (ref 8.7–10.4)
CHLORIDE SERPL-SCNC: 106 MMOL/L (ref 98–112)
CO2 SERPL-SCNC: 27 MMOL/L (ref 21–32)
CREAT BLD-MCNC: 0.95 MG/DL
DEPRECATED HBV CORE AB SER IA-ACNC: 263.9 NG/ML
EGFRCR SERPLBLD CKD-EPI 2021: 80 ML/MIN/1.73M2 (ref 60–?)
EOSINOPHIL # BLD AUTO: 0.11 X10(3) UL (ref 0–0.7)
EOSINOPHIL NFR BLD AUTO: 2 %
ERYTHROCYTE [DISTWIDTH] IN BLOOD BY AUTOMATED COUNT: 14.2 %
GLOBULIN PLAS-MCNC: 2.8 G/DL (ref 2–3.5)
GLUCOSE BLD-MCNC: 86 MG/DL (ref 70–99)
HCT VFR BLD AUTO: 39.8 %
HGB BLD-MCNC: 13.3 G/DL
IMM GRANULOCYTES # BLD AUTO: 0.04 X10(3) UL (ref 0–1)
IMM GRANULOCYTES NFR BLD: 0.7 %
IRON SATN MFR SERPL: 36 %
IRON SERPL-MCNC: 105 UG/DL
LYMPHOCYTES # BLD AUTO: 1.17 X10(3) UL (ref 1–4)
LYMPHOCYTES NFR BLD AUTO: 21.7 %
MCH RBC QN AUTO: 35.8 PG (ref 26–34)
MCHC RBC AUTO-ENTMCNC: 33.4 G/DL (ref 31–37)
MCV RBC AUTO: 107 FL
MONOCYTES # BLD AUTO: 0.56 X10(3) UL (ref 0.1–1)
MONOCYTES NFR BLD AUTO: 10.4 %
NEUTROPHILS # BLD AUTO: 3.46 X10 (3) UL (ref 1.5–7.7)
NEUTROPHILS # BLD AUTO: 3.46 X10(3) UL (ref 1.5–7.7)
NEUTROPHILS NFR BLD AUTO: 64.3 %
OSMOLALITY SERPL CALC.SUM OF ELEC: 281 MOSM/KG (ref 275–295)
PLATELET # BLD AUTO: 423 10(3)UL (ref 150–450)
POTASSIUM SERPL-SCNC: 4.4 MMOL/L (ref 3.5–5.1)
PROT SERPL-MCNC: 7.2 G/DL (ref 5.7–8.2)
RBC # BLD AUTO: 3.72 X10(6)UL
SODIUM SERPL-SCNC: 136 MMOL/L (ref 136–145)
TOTAL IRON BINDING CAPACITY: 291 UG/DL (ref 250–425)
TRANSFERRIN SERPL-MCNC: 220 MG/DL (ref 215–365)
WBC # BLD AUTO: 5.4 X10(3) UL (ref 4–11)

## 2024-09-06 PROCEDURE — 80053 COMPREHEN METABOLIC PANEL: CPT

## 2024-09-06 PROCEDURE — 85025 COMPLETE CBC W/AUTO DIFF WBC: CPT

## 2024-09-06 PROCEDURE — 36415 COLL VENOUS BLD VENIPUNCTURE: CPT

## 2024-09-06 PROCEDURE — 83550 IRON BINDING TEST: CPT

## 2024-09-06 PROCEDURE — 82728 ASSAY OF FERRITIN: CPT

## 2024-09-06 PROCEDURE — 83540 ASSAY OF IRON: CPT

## 2024-09-08 NOTE — PROGRESS NOTES
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    - here with his wife, [place]  - taking apixaban for afib  - recently got COVID, finished course of molnupiravir  - 8/14/24 CTA chest with chronic celiac dissection, recommended to repeat ultrasound in a year  - feels like he still has lingering chest discomfort  - working out on [employer] bike but has not had as much physical activity as before  - going back to [place] for January and February  - denies dyspnea on exertion      Past Medical History:  Past Medical History:    Arrhythmia    Atrial fibrillation (HCC)    Fib/Flutter with ablation    Duodenal ulcer    Elevated coronary artery calcium score    Essential hypertension    Hashimoto's thyroiditis    HEMORRHOIDS    Hyperlipidemia    HYPOTHYROIDISM    KIDNEY STONE    Osteoarthritis    PUD (peptic ulcer disease)    Tubular [id number]-[id number]. Hoscheit     Past Surgical History:   Procedure Laterality Date    Ablation      cardiac    Angioplasty (coronary)  2015    Cataract      bilateral-- Dr. Lori Kuo    Colonoscopy,biopsy  7/07    3 mm. sigmoid hyperplastic polyp     Colonoscopy,biopsy  3/01    adenomatous polyp    Colonoscopy,diagnostic  1994    adenomatous polyp    Colonoscopy,diagnostic  1997    wnl    Colonoscopy,diagnostic  8/9/10    wnl, hemorrhoids    Colonoscopy,diagnostic  06/29/2013    diverticulosis, 2 small transverse colon adenomatous polyps    Colonoscopy,diagnostic  09/10/2018    divertiuclosis, ascending/2 hepatic flexure/sigmoid polyps     Colonoscopy,remv lesn,snare  7/06    1.5 cm. flat ascending colonic adenoma    Egd N/A 9/10/2018    Procedure: ESOPHAGOGASTRODUODENOSCOPY, COLONOSCOPY, POSSIBLE BIOPSY, POSSIBLE POLYPECTOMY 58154, 17085;  Surgeon: Romain Nagel MD;  [facility]    Hemorrhoidectomy  12/2017    rubber banding    Hernia surgery      umbilical    Knee replacement surgery Right     Other surgical history      knee arthroscopy    Other surgical history      cardiac ablation    Other surgical history  [id number]    right thumb surgery    Repair rotator cuff,acute Right 7/20/16    Upper gi endoscopy,biopsy  3/01    wnl, SB Bx wnl    Upper gi endoscopy,diagnosis  06/29/2013    antral gastric and duodenal ulcerations, gastric bx benign and SB Bx negative, showed peptic duodenitis    Upper gi endoscopy,diagnosis  09/10/2018    Normal, gastric bx taken       Home Medications:  No outpatient medications have been marked as taking for the 9/9/24 encounter (Appointment) with Solis Melendez MD.       Allergies:   Allergies   Allergen Reactions    Bees Tightness in Throat     Bee Venom.       Psychosocial History:  Social History     Socioeconomic History    Marital status:      Spouse name: Not on file    Number of children: Not on file    Years of education: Not on file    Highest education level: Not on file   Occupational History    Not on file   Tobacco Use    Smoking status: Former     Current packs/day: 1.00     Average packs/day: 1 pack/day for 20.0 years (20.0 ttl pk-yrs)     Types: Cigarettes    Smokeless tobacco: Former     Quit date: 1/1/1974   Vaping Use    Vaping status: Never Used   Substance and Sexual Activity    Alcohol use: Yes     Alcohol/week: 0.0 standard drinks of alcohol     Comment: social    Drug use: No    Sexual activity: Never   Other Topics Concern     Service Not Asked    Blood Transfusions Not Asked    Caffeine Concern Not Asked    Occupational Exposure Not Asked    Hobby Hazards  Not Asked    Sleep Concern Not Asked    Stress Concern Not Asked    Weight Concern Not Asked    Special Diet Not Asked    Back Care Not Asked    Exercise Not Asked    Bike Helmet Not Asked    Seat Belt Yes    Self-Exams Not Asked   Social History Narrative    Not on file     Social Determinants of Health     Financial Resource Strain: Not on file   Food Insecurity: No Food Insecurity (8/14/2024)    Food Insecurity     Food Insecurity: Never true   Transportation Needs: No Transportation Needs (8/14/2024)    Transportation Needs     Lack of Transportation: No     Car Seat: Not on file   Physical Activity: Not on file   Stress: Not on file   Social Connections: Not on file   Housing Stability: Low Risk  (8/14/2024)    Housing Stability     Housing Instability: No     Housing Instability Emergency: Not on file     Crib or Bassinette: Not on file       Family Medical History:  Family History   Problem Relation Age of Onset    Other (Other) Father         infection    Heart Disorder Mother     Cancer Mother         liver tumor    Cancer Maternal Uncle         stomach cancer       Review of Systems:  A 10-point ROS was done with pertinent positives and negative per the HPI    Vital Signs:  Height: --  Weight: --  BSA (Calculated - sq m): --  Pulse: --  BP: --  Temp: --  Do Not Use - Resp Rate: --  SpO2: --    Wt Readings from Last 6 Encounters:   08/14/24 84.1 kg (185 lb 6.4 oz)   03/08/24 81.9 kg (180 lb 9.6 oz)   09/07/23 82.6 kg (182 lb 3.2 oz)   03/03/23 80.4 kg (177 lb 3.2 oz)   12/16/22 80.6 kg (177 lb 9.6 oz)   12/14/21 82.1 kg (181 lb)       Physical Examination:  General: Patient is alert and oriented, not in acute distress  Psych: Mood and affect are appropriate  Eyes: EOMI, PERRL  ENT: Oropharynx is clear, no adenopathy  CV: no LE edema  Respiratory: Non-labored respirations. Some wheeziness  GI/Abd: Soft, non-tender   Neurological: Grossly intact   Skin: no rashes or petechiae    Laboratory:  Lab Results    Component Value Date    WBC 5.4 09/06/2024    WBC 12.2 (H) 08/14/2024    WBC 8.9 08/13/2024    HGB 13.3 09/06/2024    HGB 12.7 (L) 08/14/2024    HGB 13.2 08/13/2024    HCT 39.8 09/06/2024    .0 (H) 09/06/2024    MCH 35.8 (H) 09/06/2024    MCHC 33.4 09/06/2024    RDW 14.2 09/06/2024    .0 09/06/2024    .0 08/14/2024    .0 08/13/2024     Lab Results   Component Value Date    GLU 86 09/06/2024    BUN 13 09/06/2024    BUNCREA 15.1 07/02/2021    CREATSERUM 0.95 09/06/2024    CREATSERUM 0.85 08/14/2024    CREATSERUM 0.91 08/13/2024    ANIONGAP 3 09/06/2024    GFR 84 07/10/2017    GFRNAA 79 06/13/2022    GFRAA 91 06/13/2022    CA 10.3 09/06/2024    OSMOCALC 281 09/06/2024    ALKPHO 97 09/06/2024    AST 23 09/06/2024    ALT 24 09/06/2024    BILT 0.8 09/06/2024    TP 7.2 09/06/2024    ALB 4.4 09/06/2024    GLOBULIN 2.8 09/06/2024    AGRATIO 1.1 (L) 06/19/2013     09/06/2024    K 4.4 09/06/2024     09/06/2024    CO2 27.0 09/06/2024     No results found for: \"PTT\", \"PT\", \"INR\"      Impression & Plan:     Essential thrombocythemia  - JAK2 V617F mutation positive  - platelet count at goal <450k on 500mg hydrea daily. Prior thrombocytosis may have been due to inflammation from hemorrhoidal bleeding which is no longer an issue  - no need for aspirin as patient already on apixaban for his afib  - continue labs q3 monthly, follow up in 6months    Atrial fibrillation  - continue apixaban    Iron deficiency anemia  - iron studies without evidence of iron deficiency. He previously received IV infed.    Post-COVID infection  - recommended following up with pulmonology if pulmonary symptoms don't resolve within the next month    Follow up: 6 months    Solis Melendez  Hematology/Medical Oncology  [facility]

## 2024-09-09 ENCOUNTER — OFFICE VISIT (OUTPATIENT)
Dept: HEMATOLOGY/ONCOLOGY | Facility: HOSPITAL | Age: 79
End: 2024-09-09
Attending: INTERNAL MEDICINE
Payer: MEDICARE

## 2024-09-09 VITALS
HEIGHT: 69.02 IN | DIASTOLIC BLOOD PRESSURE: 73 MMHG | OXYGEN SATURATION: 99 % | RESPIRATION RATE: 18 BRPM | SYSTOLIC BLOOD PRESSURE: 117 MMHG | BODY MASS INDEX: 26.16 KG/M2 | HEART RATE: 59 BPM | TEMPERATURE: 98 F | WEIGHT: 176.63 LBS

## 2024-09-09 DIAGNOSIS — D47.3 ESSENTIAL THROMBOCYTOSIS (HCC): Primary | ICD-10-CM

## 2024-09-09 DIAGNOSIS — D50.9 IRON DEFICIENCY ANEMIA, UNSPECIFIED IRON DEFICIENCY ANEMIA TYPE: ICD-10-CM

## 2024-09-09 PROCEDURE — 99215 OFFICE O/P EST HI 40 MIN: CPT | Performed by: INTERNAL MEDICINE

## 2024-09-09 PROCEDURE — G2211 COMPLEX E/M VISIT ADD ON: HCPCS | Performed by: INTERNAL MEDICINE

## 2024-09-09 NOTE — PROGRESS NOTES
Education Record    Learner:  Patient and Spouse    Disease / Diagnosis: Essential Thrombocythemia    Barriers / Limitations:  None   Comments:    Method:  Discussion   Comments:    General Topics:  Medication, Side effects and symptom management, and Plan of care reviewed   Comments:    Outcome:  Shows understanding   Comments:    Here for follow up. Still recovering from COVID infection- has occasional cough, congestion. Compliant with Hydrea.

## 2024-09-13 ENCOUNTER — LAB ENCOUNTER (OUTPATIENT)
Dept: LAB | Facility: HOSPITAL | Age: 79
End: 2024-09-13
Attending: OTOLARYNGOLOGY
Payer: MEDICARE

## 2024-09-13 DIAGNOSIS — E07.9 DISEASE OF THYROID GLAND: Primary | ICD-10-CM

## 2024-09-13 LAB — T3FREE SERPL-MCNC: 2.77 PG/ML (ref 2.4–4.2)

## 2024-09-13 PROCEDURE — 36415 COLL VENOUS BLD VENIPUNCTURE: CPT

## 2024-09-13 PROCEDURE — 84481 FREE ASSAY (FT-3): CPT

## 2024-11-21 ENCOUNTER — HOSPITAL ENCOUNTER (OUTPATIENT)
Dept: CV DIAGNOSTICS | Facility: HOSPITAL | Age: 79
Discharge: HOME | End: 2024-11-21
Attending: INTERNAL MEDICINE
Payer: MEDICARE

## 2024-11-21 DIAGNOSIS — I48.0 PAF (PAROXYSMAL ATRIAL FIBRILLATION) (HCC): ICD-10-CM

## 2024-11-21 DIAGNOSIS — Z79.899 HIGH RISK MEDICATIONS (NOT ANTICOAGULANTS) LONG-TERM USE: ICD-10-CM

## 2024-11-21 DIAGNOSIS — Z79.01 ANTICOAGULATED: ICD-10-CM

## 2024-11-21 PROCEDURE — 93018 CV STRESS TEST I&R ONLY: CPT | Performed by: INTERNAL MEDICINE

## 2024-11-21 PROCEDURE — 93350 STRESS TTE ONLY: CPT | Performed by: INTERNAL MEDICINE

## 2024-11-21 PROCEDURE — 93017 CV STRESS TEST TRACING ONLY: CPT | Performed by: INTERNAL MEDICINE

## 2024-12-10 ENCOUNTER — NURSE ONLY (OUTPATIENT)
Dept: HEMATOLOGY/ONCOLOGY | Facility: HOSPITAL | Age: 79
End: 2024-12-10
Attending: INTERNAL MEDICINE
Payer: MEDICARE

## 2024-12-10 DIAGNOSIS — D47.3 ESSENTIAL THROMBOCYTOSIS (HCC): ICD-10-CM

## 2024-12-10 DIAGNOSIS — D50.9 IRON DEFICIENCY ANEMIA, UNSPECIFIED IRON DEFICIENCY ANEMIA TYPE: ICD-10-CM

## 2024-12-10 LAB
ALBUMIN SERPL-MCNC: 4.4 G/DL (ref 3.2–4.8)
ALBUMIN/GLOB SERPL: 1.4 {RATIO} (ref 1–2)
ALP LIVER SERPL-CCNC: 103 U/L
ALT SERPL-CCNC: 21 U/L
ANION GAP SERPL CALC-SCNC: 8 MMOL/L (ref 0–18)
AST SERPL-CCNC: 24 U/L (ref ?–34)
BASOPHILS # BLD AUTO: 0.07 X10(3) UL (ref 0–0.2)
BASOPHILS NFR BLD AUTO: 1.3 %
BILIRUB SERPL-MCNC: 0.8 MG/DL (ref 0.2–1.1)
BUN BLD-MCNC: 13 MG/DL (ref 9–23)
CALCIUM BLD-MCNC: 10.6 MG/DL (ref 8.7–10.4)
CHLORIDE SERPL-SCNC: 105 MMOL/L (ref 98–112)
CO2 SERPL-SCNC: 26 MMOL/L (ref 21–32)
CREAT BLD-MCNC: 0.95 MG/DL
DEPRECATED HBV CORE AB SER IA-ACNC: 180 NG/ML
EGFRCR SERPLBLD CKD-EPI 2021: 80 ML/MIN/1.73M2 (ref 60–?)
EOSINOPHIL # BLD AUTO: 0.15 X10(3) UL (ref 0–0.7)
EOSINOPHIL NFR BLD AUTO: 2.8 %
ERYTHROCYTE [DISTWIDTH] IN BLOOD BY AUTOMATED COUNT: 13.2 %
GLOBULIN PLAS-MCNC: 3.2 G/DL (ref 2–3.5)
GLUCOSE BLD-MCNC: 86 MG/DL (ref 70–99)
HCT VFR BLD AUTO: 42.6 %
HGB BLD-MCNC: 14 G/DL
IMM GRANULOCYTES # BLD AUTO: 0.05 X10(3) UL (ref 0–1)
IMM GRANULOCYTES NFR BLD: 0.9 %
IRON SATN MFR SERPL: 28 %
IRON SERPL-MCNC: 87 UG/DL
LYMPHOCYTES # BLD AUTO: 1.05 X10(3) UL (ref 1–4)
LYMPHOCYTES NFR BLD AUTO: 19.7 %
MCH RBC QN AUTO: 36.1 PG (ref 26–34)
MCHC RBC AUTO-ENTMCNC: 32.9 G/DL (ref 31–37)
MCV RBC AUTO: 109.8 FL
MONOCYTES # BLD AUTO: 0.42 X10(3) UL (ref 0.1–1)
MONOCYTES NFR BLD AUTO: 7.9 %
NEUTROPHILS # BLD AUTO: 3.58 X10 (3) UL (ref 1.5–7.7)
NEUTROPHILS # BLD AUTO: 3.58 X10(3) UL (ref 1.5–7.7)
NEUTROPHILS NFR BLD AUTO: 67.4 %
OSMOLALITY SERPL CALC.SUM OF ELEC: 287 MOSM/KG (ref 275–295)
PLATELET # BLD AUTO: 421 10(3)UL (ref 150–450)
POTASSIUM SERPL-SCNC: 4.3 MMOL/L (ref 3.5–5.1)
PROT SERPL-MCNC: 7.6 G/DL (ref 5.7–8.2)
RBC # BLD AUTO: 3.88 X10(6)UL
SODIUM SERPL-SCNC: 139 MMOL/L (ref 136–145)
TOTAL IRON BINDING CAPACITY: 311 UG/DL (ref 250–425)
TRANSFERRIN SERPL-MCNC: 235 MG/DL (ref 215–365)
WBC # BLD AUTO: 5.3 X10(3) UL (ref 4–11)

## 2024-12-10 PROCEDURE — 83550 IRON BINDING TEST: CPT

## 2024-12-10 PROCEDURE — 82728 ASSAY OF FERRITIN: CPT

## 2024-12-10 PROCEDURE — 36415 COLL VENOUS BLD VENIPUNCTURE: CPT

## 2024-12-10 PROCEDURE — 83540 ASSAY OF IRON: CPT

## 2024-12-10 PROCEDURE — 80053 COMPREHEN METABOLIC PANEL: CPT

## 2024-12-10 PROCEDURE — 85025 COMPLETE CBC W/AUTO DIFF WBC: CPT

## 2025-03-07 ENCOUNTER — NURSE ONLY (OUTPATIENT)
Age: 80
End: 2025-03-07
Attending: INTERNAL MEDICINE
Payer: MEDICARE

## 2025-03-07 DIAGNOSIS — D50.9 IRON DEFICIENCY ANEMIA, UNSPECIFIED IRON DEFICIENCY ANEMIA TYPE: ICD-10-CM

## 2025-03-07 DIAGNOSIS — D47.3 ESSENTIAL THROMBOCYTOSIS (HCC): ICD-10-CM

## 2025-03-07 LAB
ALBUMIN SERPL-MCNC: 4.8 G/DL (ref 3.2–4.8)
ALBUMIN/GLOB SERPL: 1.8 {RATIO} (ref 1–2)
ALP LIVER SERPL-CCNC: 95 U/L
ALT SERPL-CCNC: 18 U/L
ANION GAP SERPL CALC-SCNC: 10 MMOL/L (ref 0–18)
AST SERPL-CCNC: 24 U/L (ref ?–34)
BASOPHILS # BLD AUTO: 0.08 X10(3) UL (ref 0–0.2)
BASOPHILS NFR BLD AUTO: 1.5 %
BILIRUB SERPL-MCNC: 0.7 MG/DL (ref 0.2–1.1)
BUN BLD-MCNC: 10 MG/DL (ref 9–23)
CALCIUM BLD-MCNC: 10.5 MG/DL (ref 8.7–10.6)
CHLORIDE SERPL-SCNC: 104 MMOL/L (ref 98–112)
CO2 SERPL-SCNC: 26 MMOL/L (ref 21–32)
CREAT BLD-MCNC: 0.95 MG/DL
DEPRECATED HBV CORE AB SER IA-ACNC: 165 NG/ML
EGFRCR SERPLBLD CKD-EPI 2021: 80 ML/MIN/1.73M2 (ref 60–?)
EOSINOPHIL # BLD AUTO: 0.12 X10(3) UL (ref 0–0.7)
EOSINOPHIL NFR BLD AUTO: 2.3 %
ERYTHROCYTE [DISTWIDTH] IN BLOOD BY AUTOMATED COUNT: 13.5 %
FASTING STATUS PATIENT QL REPORTED: NO
GLOBULIN PLAS-MCNC: 2.6 G/DL (ref 2–3.5)
GLUCOSE BLD-MCNC: 74 MG/DL (ref 70–99)
HCT VFR BLD AUTO: 40.4 %
HGB BLD-MCNC: 13.5 G/DL
IMM GRANULOCYTES # BLD AUTO: 0.04 X10(3) UL (ref 0–1)
IMM GRANULOCYTES NFR BLD: 0.8 %
IRON SATN MFR SERPL: 29 %
IRON SERPL-MCNC: 85 UG/DL
LYMPHOCYTES # BLD AUTO: 1.27 X10(3) UL (ref 1–4)
LYMPHOCYTES NFR BLD AUTO: 24.3 %
MCH RBC QN AUTO: 35.7 PG (ref 26–34)
MCHC RBC AUTO-ENTMCNC: 33.4 G/DL (ref 31–37)
MCV RBC AUTO: 106.9 FL
MONOCYTES # BLD AUTO: 0.63 X10(3) UL (ref 0.1–1)
MONOCYTES NFR BLD AUTO: 12.1 %
NEUTROPHILS # BLD AUTO: 3.08 X10 (3) UL (ref 1.5–7.7)
NEUTROPHILS # BLD AUTO: 3.08 X10(3) UL (ref 1.5–7.7)
NEUTROPHILS NFR BLD AUTO: 59 %
OSMOLALITY SERPL CALC.SUM OF ELEC: 288 MOSM/KG (ref 275–295)
PLATELET # BLD AUTO: 496 10(3)UL (ref 150–450)
POTASSIUM SERPL-SCNC: 4.7 MMOL/L (ref 3.5–5.1)
PROT SERPL-MCNC: 7.4 G/DL (ref 5.7–8.2)
RBC # BLD AUTO: 3.78 X10(6)UL
SODIUM SERPL-SCNC: 140 MMOL/L (ref 136–145)
TOTAL IRON BINDING CAPACITY: 293 UG/DL (ref 250–425)
TRANSFERRIN SERPL-MCNC: 222 MG/DL (ref 215–365)
WBC # BLD AUTO: 5.2 X10(3) UL (ref 4–11)

## 2025-03-10 ENCOUNTER — OFFICE VISIT (OUTPATIENT)
Age: 80
End: 2025-03-10
Attending: INTERNAL MEDICINE
Payer: MEDICARE

## 2025-03-10 VITALS
OXYGEN SATURATION: 97 % | DIASTOLIC BLOOD PRESSURE: 79 MMHG | RESPIRATION RATE: 18 BRPM | HEIGHT: 69.02 IN | SYSTOLIC BLOOD PRESSURE: 126 MMHG | WEIGHT: 179.81 LBS | HEART RATE: 72 BPM | TEMPERATURE: 97 F | BODY MASS INDEX: 26.63 KG/M2

## 2025-03-10 DIAGNOSIS — I48.0 PAROXYSMAL ATRIAL FIBRILLATION (HCC): ICD-10-CM

## 2025-03-10 DIAGNOSIS — D47.3 ESSENTIAL THROMBOCYTOSIS (HCC): Primary | ICD-10-CM

## 2025-03-10 RX ORDER — KETOCONAZOLE 20 MG/ML
SHAMPOO, SUSPENSION TOPICAL
COMMUNITY
Start: 2024-12-21

## 2025-03-10 RX ORDER — VARDENAFIL HYDROCHLORIDE 20 MG/1
20 TABLET ORAL AS NEEDED
COMMUNITY
Start: 2024-12-27

## 2025-03-10 RX ORDER — HYDROXYUREA 500 MG/1
500 CAPSULE ORAL
Qty: 90 CAPSULE | Refills: 3 | Status: SHIPPED | OUTPATIENT
Start: 2025-03-10

## 2025-03-10 NOTE — PROGRESS NOTES
Education Record    Learner:  Patient and Spouse    Disease / Diagnosis: Essential Thrombocythemia    Barriers / Limitations:  None   Comments:    Method:  Discussion   Comments:    General Topics:  Medication and Side effects and symptom management   Comments:    Outcome:  Shows understanding   Comments:    Here for follow up. He is feeling very well. Labs checked already. Taking hydrea 500mg daily.

## 2025-03-10 NOTE — PROGRESS NOTES
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    - here with his wife, [place]  - taking apixaban for afib  - feeling well  - notes some minor on/off issues with his hemorrhoids but not bothersome  - just came back from [place]    Past Medical History:  Past Medical History:    Arrhythmia    Atrial fibrillation (HCC)    Fib/Flutter with ablation    Duodenal ulcer    Elevated coronary artery calcium score    Essential hypertension    Hashimoto's thyroiditis    HEMORRHOIDS    Hyperlipidemia    HYPOTHYROIDISM    KIDNEY STONE    Osteoarthritis    PUD (peptic ulcer disease)    Tubular [id number]. Hoscheit     Past Surgical History:   Procedure Laterality Date    Ablation      cardiac    Angioplasty (coronary)  2015    Cataract      bilateral-- Dr. Lori Kuo    Colonoscopy,biopsy  7/07    3 mm. sigmoid hyperplastic polyp     Colonoscopy,biopsy  3/01    adenomatous polyp    Colonoscopy,diagnostic  1994    adenomatous polyp    Colonoscopy,diagnostic  1997    wnl    Colonoscopy,diagnostic  8/9/10    wnl, hemorrhoids    Colonoscopy,diagnostic  06/29/2013    diverticulosis, 2 small transverse colon adenomatous polyps    Colonoscopy,diagnostic  09/10/2018    divertiuclosis, ascending/2 hepatic flexure/sigmoid polyps    Colonoscopy,remv lesn,snare  7/06    1.5 cm. flat ascending colonic adenoma    Egd N/A 9/10/2018    Procedure: ESOPHAGOGASTRODUODENOSCOPY, COLONOSCOPY, POSSIBLE BIOPSY, POSSIBLE POLYPECTOMY 80877, [id number];  Surgeon: Romain Nagel MD;  [place] [facility]  [place], [facility]    Hemorrhoidectomy  12/2017    rubber banding    Hernia surgery      umbilical    Knee replacement surgery Right     Other surgical history      knee arthroscopy    Other surgical history      cardiac ablation    Other surgical history  [id number]    right thumb surgery    Repair rotator cuff,acute Right 7/20/16    Upper gi endoscopy,biopsy  3/01    wnl, SB Bx wnl    Upper gi endoscopy,diagnosis  06/29/2013    antral gastric and duodenal ulcerations, gastric bx benign and SB Bx negative, showed peptic duodenitis    Upper gi endoscopy,diagnosis  09/10/2018    Normal, gastric bx taken       Home Medications:   hydroxyurea 500 MG Oral Cap Take 1 capsule (500 mg total) by mouth once daily. 90 capsule 3    levothyroxine 88 MCG Oral Tab Take 1 tablet (88 mcg total) by mouth before breakfast. 90 tablet 1    flecainide 50 MG Oral Tab Take 1 tablet (50 mg total) by mouth 2 (two) times daily. 180 tablet 3    apixaban (ELIQUIS) 5 MG Oral Tab Take 1 tablet (5 mg total) by mouth 2 (two) times daily. 180 tablet 3    atorvastatin 40 MG Oral Tab Take 1 tablet (40 mg total) by mouth daily. 90 tablet 3    omeprazole 20 MG Oral Capsule Delayed Release Take 1 capsule (20 mg total) by mouth daily. 90 capsule 3    Metoprolol Succinate ER 25 MG Oral Tablet 24 Hr Take 1 tablet (25 mg total) by mouth daily. (Patient taking differently: Take 1 tablet (25 mg total) by mouth at bedtime.) 90 tablet 3    Vitamin D3 25 MCG (1000 UT) Oral Tab Take 1 tablet (1,000 Units total) by mouth daily.      folic acid 400 MCG Oral Tab Take 2 tablets (800 mcg total) by mouth daily.         Allergies:   Allergies   Allergen Reactions    Bees Tightness in Throat     Bee Venom.       Psychosocial History:  Social History     Socioeconomic History    Marital status:      Spouse name: Not on file    Number of children: Not on file    Years of education: Not on file    Highest education level: Not on file   Occupational History    Not on file    Tobacco Use    Smoking status: Former     Current packs/day: 1.00     Average packs/day: 1 pack/day for 20.0 years (20.0 ttl pk-yrs)     Types: Cigarettes    Smokeless tobacco: Former     Quit date: 1/1/1974   Vaping Use    Vaping status: Never Used   Substance and Sexual Activity    Alcohol use: Yes     Alcohol/week: 0.0 standard drinks of alcohol     Comment: social    Drug use: No    Sexual activity: Never   Other Topics Concern     Service Not Asked    Blood Transfusions Not Asked    Caffeine Concern Not Asked    Occupational Exposure Not Asked    Hobby Hazards Not Asked    Sleep Concern Not Asked    Stress Concern Not Asked    Weight Concern Not Asked    Special Diet Not Asked    Back Care Not Asked    Exercise Not Asked    Bike Helmet Not Asked    Seat Belt Yes    Self-Exams Not Asked   Social History Narrative    Not on file     Social Drivers of Health     Food Insecurity: No Food Insecurity (8/14/2024)    Food Insecurity     Food Insecurity: Never true   Transportation Needs: No Transportation Needs (8/14/2024)    Transportation Needs     Lack of Transportation: No     Car Seat: Not on file   Housing Stability: Low Risk  (8/14/2024)    Housing Stability     Housing Instability: No     Housing Instability Emergency: Not on file     Crib or Bassinette: Not on file       Family Medical History:  Family History   Problem Relation Age of Onset    Other (Other) Father         infection    Heart Disorder Mother     Cancer Mother         liver tumor    Cancer Maternal Uncle         stomach cancer       Review of Systems:  A 10-point ROS was done with pertinent positives and negative per the HPI    Vital Signs:  Height: 175.3 cm (5' 9.02\") (03/10 0856)  Weight: 81.6 kg (179 lb 12.8 oz) (03/10 0856)  BSA (Calculated - sq m): 1.97 sq meters (03/10 0856)  Pulse: 72 (03/10 0856)  BP: 126/79 (03/10 0856)  Temp: 97.2 °F (36.2 °C) (03/10 0856)  Do Not Use - Resp Rate: --  SpO2: 97 % (03/10 0856)    Wt Readings  from Last 6 Encounters:   03/10/25 81.6 kg (179 lb 12.8 oz)   09/09/24 80.1 kg (176 lb 9.6 oz)   08/14/24 84.1 kg (185 lb 6.4 oz)   03/08/24 81.9 kg (180 lb 9.6 oz)   09/07/23 82.6 kg (182 lb 3.2 oz)   03/03/23 80.4 kg (177 lb 3.2 oz)       Physical Examination:  General: Patient is alert and oriented, not in acute distress  Psych: Mood and affect are appropriate  Eyes: EOMI, PERRL  ENT: Oropharynx is clear, no adenopathy  CV: no LE edema  Respiratory: Non-labored respirations.  GI/Abd: Soft, non-tender   Neurological: Grossly intact   Skin: no rashes or petechiae    Laboratory:  Lab Results   Component Value Date    WBC 5.2 03/07/2025    WBC 5.3 12/10/2024    WBC 5.4 09/06/2024    HGB 13.5 03/07/2025    HGB 14.0 12/10/2024    HGB 13.3 09/06/2024    HCT 40.4 03/07/2025    .9 (H) 03/07/2025    MCH 35.7 (H) 03/07/2025    MCHC 33.4 03/07/2025    RDW 13.5 03/07/2025    .0 (H) 03/07/2025    .0 12/10/2024    .0 09/06/2024     Lab Results   Component Value Date    GLU 74 03/07/2025    BUN 10 03/07/2025    BUNCREA 15.1 07/02/2021    CREATSERUM 0.95 03/07/2025    CREATSERUM 0.95 12/10/2024    CREATSERUM 0.95 09/06/2024    ANIONGAP 10 03/07/2025    GFR 84 07/10/2017    GFRNAA 79 06/13/2022    GFRAA 91 06/13/2022    CA 10.5 03/07/2025    OSMOCALC 288 03/07/2025    ALKPHO 95 03/07/2025    AST 24 03/07/2025    ALT 18 03/07/2025    BILT 0.7 03/07/2025    TP 7.4 03/07/2025    ALB 4.8 03/07/2025    GLOBULIN 2.6 03/07/2025    AGRATIO 1.1 (L) 06/19/2013     03/07/2025    K 4.7 03/07/2025     03/07/2025    CO2 26.0 03/07/2025     No results found for: \"PTT\", \"PT\", \"INR\"      Impression & Plan:     Essential thrombocythemia  - JAK2 V617F mutation positive  - platelet slightly higher at 496k but not an issue. He has been at this range before, likely due to inflammation from hemorrhoids rather than underlying ET. Continue hydrea at 500mg daily  - no need for aspirin as patient already on apixaban  for his afib  - continue labs q3 monthly, follow up in 6months    Atrial fibrillation  - continue apixaban    Iron deficiency anemia  - iron studies without evidence of iron deficiency. He previously received IV infed.    Follow up: 6 months    Solis Melendez MD  [facility] Hematology Oncology

## 2025-04-16 ENCOUNTER — LAB ENCOUNTER (OUTPATIENT)
Dept: LAB | Facility: HOSPITAL | Age: 80
End: 2025-04-16
Attending: OTOLARYNGOLOGY
Payer: MEDICARE

## 2025-04-16 DIAGNOSIS — E07.9 THYROID DYSFUNCTION: Primary | ICD-10-CM

## 2025-04-16 LAB
T3FREE SERPL-MCNC: 2.79 PG/ML (ref 2.4–4.2)
T4 FREE SERPL-MCNC: 1.5 NG/DL (ref 0.8–1.7)
TSI SER-ACNC: 1.86 UIU/ML (ref 0.55–4.78)

## 2025-04-16 PROCEDURE — 36415 COLL VENOUS BLD VENIPUNCTURE: CPT

## 2025-04-16 PROCEDURE — 84439 ASSAY OF FREE THYROXINE: CPT

## 2025-04-16 PROCEDURE — 84481 FREE ASSAY (FT-3): CPT

## 2025-04-16 PROCEDURE — 84443 ASSAY THYROID STIM HORMONE: CPT

## 2025-06-02 ENCOUNTER — NURSE ONLY (OUTPATIENT)
Age: 80
End: 2025-06-02
Attending: INTERNAL MEDICINE
Payer: MEDICARE

## 2025-06-02 DIAGNOSIS — D50.9 IRON DEFICIENCY ANEMIA, UNSPECIFIED IRON DEFICIENCY ANEMIA TYPE: ICD-10-CM

## 2025-06-02 DIAGNOSIS — D47.3 ESSENTIAL THROMBOCYTOSIS (HCC): ICD-10-CM

## 2025-06-02 LAB
ALBUMIN SERPL-MCNC: 4.5 G/DL (ref 3.2–4.8)
ALBUMIN/GLOB SERPL: 1.8 {RATIO} (ref 1–2)
ALP LIVER SERPL-CCNC: 95 U/L (ref 45–117)
ALT SERPL-CCNC: 16 U/L (ref 10–49)
ANION GAP SERPL CALC-SCNC: 8 MMOL/L (ref 0–18)
AST SERPL-CCNC: 20 U/L (ref ?–34)
BASOPHILS # BLD AUTO: 0.08 X10(3) UL (ref 0–0.2)
BASOPHILS NFR BLD AUTO: 1.3 %
BILIRUB SERPL-MCNC: 0.7 MG/DL (ref 0.2–1.1)
BUN BLD-MCNC: 11 MG/DL (ref 9–23)
CALCIUM BLD-MCNC: 10 MG/DL (ref 8.7–10.6)
CHLORIDE SERPL-SCNC: 103 MMOL/L (ref 98–112)
CO2 SERPL-SCNC: 27 MMOL/L (ref 21–32)
CREAT BLD-MCNC: 0.88 MG/DL (ref 0.7–1.3)
DEPRECATED HBV CORE AB SER IA-ACNC: 97 NG/ML (ref 50–336)
EGFRCR SERPLBLD CKD-EPI 2021: 86 ML/MIN/1.73M2 (ref 60–?)
EOSINOPHIL # BLD AUTO: 0.09 X10(3) UL (ref 0–0.7)
EOSINOPHIL NFR BLD AUTO: 1.5 %
ERYTHROCYTE [DISTWIDTH] IN BLOOD BY AUTOMATED COUNT: 13.2 %
GLOBULIN PLAS-MCNC: 2.5 G/DL (ref 2–3.5)
GLUCOSE BLD-MCNC: 76 MG/DL (ref 70–99)
HCT VFR BLD AUTO: 39 % (ref 39–53)
HGB BLD-MCNC: 13.4 G/DL (ref 13–17.5)
IMM GRANULOCYTES # BLD AUTO: 0.06 X10(3) UL (ref 0–1)
IMM GRANULOCYTES NFR BLD: 1 %
IRON SATN MFR SERPL: 24 % (ref 20–50)
IRON SERPL-MCNC: 76 UG/DL (ref 65–175)
LYMPHOCYTES # BLD AUTO: 1.17 X10(3) UL (ref 1–4)
LYMPHOCYTES NFR BLD AUTO: 19.4 %
MCH RBC QN AUTO: 37.1 PG (ref 26–34)
MCHC RBC AUTO-ENTMCNC: 34.4 G/DL (ref 31–37)
MCV RBC AUTO: 108 FL (ref 80–100)
MONOCYTES # BLD AUTO: 0.65 X10(3) UL (ref 0.1–1)
MONOCYTES NFR BLD AUTO: 10.8 %
NEUTROPHILS # BLD AUTO: 3.97 X10 (3) UL (ref 1.5–7.7)
NEUTROPHILS # BLD AUTO: 3.97 X10(3) UL (ref 1.5–7.7)
NEUTROPHILS NFR BLD AUTO: 66 %
OSMOLALITY SERPL CALC.SUM OF ELEC: 284 MOSM/KG (ref 275–295)
PLATELET # BLD AUTO: 399 10(3)UL (ref 150–450)
POTASSIUM SERPL-SCNC: 4.6 MMOL/L (ref 3.5–5.1)
PROT SERPL-MCNC: 7 G/DL (ref 5.7–8.2)
RBC # BLD AUTO: 3.61 X10(6)UL (ref 3.8–5.8)
SODIUM SERPL-SCNC: 138 MMOL/L (ref 136–145)
TOTAL IRON BINDING CAPACITY: 317 UG/DL (ref 250–425)
TRANSFERRIN SERPL-MCNC: 239 MG/DL (ref 215–365)
WBC # BLD AUTO: 6 X10(3) UL (ref 4–11)

## 2025-06-09 ENCOUNTER — APPOINTMENT (OUTPATIENT)
Age: 80
End: 2025-06-09
Attending: INTERNAL MEDICINE
Payer: MEDICARE

## 2025-06-11 ENCOUNTER — APPOINTMENT (OUTPATIENT)
Age: 80
End: 2025-06-11
Attending: INTERNAL MEDICINE
Payer: MEDICARE